# Patient Record
Sex: FEMALE | Race: OTHER | HISPANIC OR LATINO | URBAN - METROPOLITAN AREA
[De-identification: names, ages, dates, MRNs, and addresses within clinical notes are randomized per-mention and may not be internally consistent; named-entity substitution may affect disease eponyms.]

---

## 2023-03-06 ENCOUNTER — EMERGENCY (EMERGENCY)
Facility: HOSPITAL | Age: 38
LOS: 1 days | Discharge: HOME | End: 2023-03-06
Attending: EMERGENCY MEDICINE
Payer: MEDICAID

## 2023-03-06 VITALS
DIASTOLIC BLOOD PRESSURE: 59 MMHG | SYSTOLIC BLOOD PRESSURE: 117 MMHG | TEMPERATURE: 99 F | WEIGHT: 206.57 LBS | RESPIRATION RATE: 18 BRPM | OXYGEN SATURATION: 97 % | HEART RATE: 88 BPM

## 2023-03-06 LAB
FLUAV AG NPH QL: SIGNIFICANT CHANGE UP
FLUBV AG NPH QL: DETECTED
SARS-COV-2 RNA SPEC QL NAA+PROBE: SIGNIFICANT CHANGE UP

## 2023-03-06 PROCEDURE — 99283 EMERGENCY DEPT VISIT LOW MDM: CPT

## 2023-03-06 PROCEDURE — 87637 SARSCOV2&INF A&B&RSV AMP PRB: CPT

## 2023-03-06 RX ORDER — IBUPROFEN 200 MG
1 TABLET ORAL
Qty: 20 | Refills: 0
Start: 2023-03-06 | End: 2023-03-10

## 2023-03-06 RX ORDER — BENZOCAINE AND MENTHOL 5; 1 G/100ML; G/100ML
1 LIQUID ORAL
Qty: 24 | Refills: 0
Start: 2023-03-06 | End: 2023-03-07

## 2023-03-06 NOTE — ED PROVIDER NOTE - MUSCULOSKELETAL, MLM
No neck stiffness. Spine appears normal, range of motion is not limited, no muscle or joint tenderness

## 2023-03-06 NOTE — ED PROVIDER NOTE - NSFOLLOWUPINSTRUCTIONS_ED_ALL_ED_FT
[facility]                                                                            Infección de las vías respiratorias superiores en adultos    Upper Respiratory Infection, Adult      Ken infección de las vías respiratorias superiores (IVRS) afecta la nariz, la garganta y las vías respiratorias superiores que llegan a los pulmones. El tipo más común de IVRS suele conocerse denilson el resfrío común. Las IVRS generalmente mejoran solas, sin tratamiento médico.      ¿Cuáles son las causas?    La causa de las IVRS es un germen (virus). Puede contraer estos gérmenes de las siguientes maneras:  •Al aspirar las gotitas que ken persona infectada elimina al toser o estornudar.      •Al tocar algo que tiene el germen (está contaminado) y luego tocarse la boca, la nariz o los ojos.        ¿Qué incrementa el riesgo?    Es más propenso a contraer ken IVRS si:  •Es muy pequeño o de edad muy avanzada.      •Tiene contacto cercano con otros, denilson en el trabajo, la escuela o un centro de atención médica.      •Fuma.      •Tiene ken enfermedad cardíaca o pulmonar a micheal plazo (crónica).      •Tiene debilitado el sistema encargado de combatir las enfermedades (sistema inmunitario).      •Tiene asma o alergias nasales.      •Tiene mucho estrés.      •Tiene un déficit nutricional.        ¿Cuáles son los signos o síntomas?    •Secreción nasal o nariz tapada (congestión).      •Tos.      •Estornudos.      •Dolor de garganta.      •Dolor de mp.      •Sensación de cansancio (fatiga).      •Fiebre.      •No querer comer tanto denilson lo hace habitualmente.      •Dolor en la frente, detrás de los ojos y por encima de los pómulos (dolor sinusal).      •Sade musculares.      •Enrojecimiento o irritación de los ojos.      •Presión en los oídos o la emily.        ¿Cómo se trata?    Las IVRS generalmente mejoran por sí solas en un período de entre 7 y 10 días. Los medicamentos no curan las IVRS, emi el médico puede recomendarle ciertos medicamentos para ayudar a aliviar los síntomas, denilson por ejemplo:  •Medicamentos para la tos de venta nahid.      •Medicamentos para reducir la tos (antitusivos). La tos es un tipo de defensa contra las infecciones que ayuda a despejar la nariz, la garganta, la tráquea y los pulmones (el sistema respiratorio). [place] estos medicamentos solamente denilson se lo haya indicado el médico.      •Medicamentos para bajar la fiebre.        Siga estas instrucciones en sykes casa:    Actividad     •Descanse todo lo que sea necesario.    •Si tiene fiebre, permanezca en sykes casa, sin ir al trabajo o a la escuela, hasta que ya no tenga fiebre, o hasta que el médico le indique que puede regresar al trabajo o a la escuela.  •Debe permanecer en sykes casa hasta que ya no pueda propagar (contagiar) la infección.      •Es posible que el médico le indique que use ken mascarilla para tener menos riesgo de propagar la infección.        Para aliviar los síntomas     •Enjuáguese la boca frecuentemente con ken mezcla de agua con sal. Para preparar agua con sal, disuelva de ½ a 1 cucharadita (de 3 a 6 g) de sal en 1 taza (237 ml) de agua tibia.      •Use un humidificador de aire frío para agregar humedad al aire. Advance puede ayudarlo a que respire mejor.        Comida y bebida   Three cups showing dark yellow, yellow, and pale yellow pee.   •Saundra suficiente líquido para mantener la orina de color amarillo pálido.      •[place] sopas y caldos transparentes.        Instrucciones generales   A sign showing that a person should not smoke.   •Use los medicamentos de venta nahid y los recetados solamente denilson se lo haya indicado el médico.      • No fume ni consuma ningún producto que contenga nicotina o tabaco. Si necesita ayuda para dejar de fumar, consulte al médico.      •Evite estar cerca de personas que fuman (evite el humo ambiental de tabaco).      •Manténgase al día con todas las vacunas (inmunizaciones) y aplíquese la vacuna contra la gripe todos los años.      •Concurra a todas las visitas de seguimiento.        Cómo evitar contagiar la infección a otros   Washing hands with soap and water.   •Lávese las nica con agua y jabón abdulaziz al menos 20 segundos. Use un desinfectante para nica si no dispone de agua y jabón.      •Evite tocarse la boca, la emily, los ojos o la nariz.      •Tosa o estornude en un pañuelo de papel o sobre sykes manga o codo. No tosa o estornude al aire ni se cubra la boca o la nariz con la mano.        Comuníquese con un médico si:    •Siente que empeora o que no mejora.    •Tiene alguno de estos síntomas:  •Fiebre o escalofríos.      •Mucosidad color marrón o doris en la nariz.      •Líquido amarillento o amarronado (secreción)que le sale de la nariz.      •Dolor en la emily, especialmente al inclinarse hacia adelante.      •Ganglios del snow inflamados.      •Dolor al tragar.      •Zonas karrie en la parte de atrás de la garganta.          Solicite ayuda de inmediato si:    •La falta de aire empeora.    •Los siguientes síntomas son muy intensos o constantes:  •Dolor de mp.      •Dolor de oído.      •Dolor en la frente, detrás de los ojos y por encima de los pómulos (dolor sinusal).      •Dolor de pecho.      •Tiene ken enfermedad pulmonar prolongada (crónica) junto con cualquiera de estos síntomas:  •Emitir sonidos de silbidos agudos al respirar, más a menudo al exhalar (sibilancias).      •Tos prolongada (más de 14 días).      •Tos con agata.      •Cambio en la mucosidad habitual.        •Tiene rigidez en el snow.    •Tiene cambios en:  •La visión.      •La audición.      •El razonamiento.      •El estado de ánimo.        Estos síntomas pueden indicar ken emergencia. Solicite ayuda de inmediato. Llame al 911.   • No espere a fani si los síntomas desaparecen.       • No conduzca por nba propios medios hasta el hospital.         Resumen    •Ken infección de las vías respiratorias superiores (IVRS) es causada por un germen (virus). El tipo más común de IVRS suele conocerse denilson el resfrío común.      •Ken IVRS suele mejorar en el transcurso de 7 a 10 días.      •Use los medicamentos de venta nahid y los recetados solamente denilson se lo haya indicado el médico.      Esta información no tiene denilson fin reemplazar el consejo del médico. Asegúrese de hacerle al médico cualquier pregunta que tenga.      Document Revised: 08/15/2022 Document Reviewed: 08/15/2022    Elsevier Patient Education © 2023 Elsevier Inc.

## 2023-03-06 NOTE — ED ADULT NURSE NOTE - OBJECTIVE STATEMENT
36 y/o  w/  throat  pain    fever  yesterday  took  tylenol   and  fever  resolve. pt  able  to  speak  in  full  sentences no  signs  of  any  distress.

## 2023-03-06 NOTE — ED ADULT NURSE NOTE - NSIMPLEMENTINTERV_GEN_ALL_ED
Size Of Margin In Cm: 0.1 Implemented All Universal Safety Interventions:  [place] to call system. Call bell, personal items and telephone within reach. Instruct patient to call for assistance. Room bathroom lighting operational. Non-slip footwear when patient is off stretcher. Physically safe environment: no spills, clutter or unnecessary equipment. Stretcher in lowest position, wheels locked, appropriate side rails in place.

## 2023-03-06 NOTE — ED PROVIDER NOTE - PATIENT PORTAL LINK FT
You can access the FollowMyHealth Patient Portal offered by [facility] by registering at the following website: [email or web address]. By joining [employer]’s FollowMyHealth portal, you will also be able to view your health information using other applications (apps) compatible with our system.

## 2023-03-06 NOTE — ED PROVIDER NOTE - OBJECTIVE STATEMENT
37 year old female with no PMHx is presenting with 5 days of throat pain and cough. Patient reports that she was last immunized for COVID-19 1 year ago. No smoking or drinking. Patient had a fever yesterday and took a Tylenol. Today she doesn't have a fever.

## 2023-03-06 NOTE — ED PROVIDER NOTE - CLINICAL SUMMARY MEDICAL DECISION MAKING FREE TEXT BOX
Impression: URI. rule out COVID-19 and flu. Will get Covid/flu swab. Give supportive therapy. Impression: URI. rule out COVID-19 and flu. Will get Covid/flu swab. Give supportive therapy.   will provide supportive therapy

## 2023-03-06 NOTE — ED PROVIDER NOTE - ENMT, MLM
Throat with mild erythema but no exudate. Airway patent, Nasal mucosa clear. Mouth with normal mucosa. Throat has no vesicles and uvula is midline.

## 2023-03-07 ENCOUNTER — EMERGENCY (EMERGENCY)
Facility: HOSPITAL | Age: 38
LOS: 1 days | Discharge: HOME | End: 2023-03-07
Attending: STUDENT IN AN ORGANIZED HEALTH CARE EDUCATION/TRAINING PROGRAM
Payer: MEDICAID

## 2023-03-07 VITALS
TEMPERATURE: 98 F | HEART RATE: 87 BPM | RESPIRATION RATE: 18 BRPM | DIASTOLIC BLOOD PRESSURE: 80 MMHG | SYSTOLIC BLOOD PRESSURE: 129 MMHG | OXYGEN SATURATION: 98 %

## 2023-03-07 PROBLEM — Z78.9 OTHER SPECIFIED HEALTH STATUS: Chronic | Status: ACTIVE | Noted: 2023-03-06

## 2023-03-07 PROCEDURE — 99283 EMERGENCY DEPT VISIT LOW MDM: CPT

## 2023-03-07 PROCEDURE — 99284 EMERGENCY DEPT VISIT MOD MDM: CPT

## 2023-03-07 RX ORDER — DEXTROMETHORPHAN HYDROBROMIDE AND PROMETHAZINE HYDROCHLORIDE 15; 6.25 MG/5ML; MG/5ML
5 SYRUP ORAL
Qty: 100 | Refills: 0
Start: 2023-03-07

## 2023-03-07 NOTE — ED PROVIDER NOTE - OBJECTIVE STATEMENT
#[id number]    37-year-old female with no prior past medical history presents with flulike symptoms x5 days.  Patient reports initial fevers which have since resolved, nasal congestion, runny nose, sore throat, cough with productive brown sputum, and watery diarrhea over the past 5 days.  Patient seen yesterday and noted to be positive for influenza B.  Patient reports taking Tessalon with little improvement, states she is presenting today due to persistent cough.  Last dose of antipyretics last night.  Denies any chest pain, shortness of breath, abdominal pain, vomiting, drooling, voice change, or rash.  Denies any history of asthma or tobacco use.  Denies any additional complaints.

## 2023-03-07 NOTE — ED PROVIDER NOTE - PHYSICAL EXAMINATION
CONSTITUTIONAL: non-toxic, well appearing  SKIN: no rash, no petechiae.  EYES: pink conjunctiva, anicteric  ENT: tongue and uvular midline, no exudates, moist mucous membranes  NECK: Supple; no meningismus, no JVD  CARD: RRR, no murmurs, equal radial pulses bilaterally 2+  RESP: CTAB, no respiratory distress  ABD: Soft, non-tender, non-distended, no peritoneal signs  EXT: Normal ROM x4. No edema.   NEURO: Alert, oriented. Neuro exam nonfocal.  PSYCH: Cooperative, appropriate.

## 2023-03-07 NOTE — ED ADULT NURSE NOTE - CHIEF COMPLAINT QUOTE
Pt c/o worsened productive cough and throat pain  was seen in this ED yesterday, +FLu, medications that were prescribed to her is not effective

## 2023-03-07 NOTE — ED ADULT TRIAGE NOTE - CHIEF COMPLAINT QUOTE
Pt c/o worsened productive cough and throat pain  was seen in this ED yesterday, +FLu Pt c/o worsened productive cough and throat pain  was seen in this ED yesterday, +FLu, medications that were prescribed to her is not effective

## 2023-03-07 NOTE — ED PROVIDER NOTE - PATIENT PORTAL LINK FT
You can access the FollowMyHealth Patient Portal offered by [facility] by registering at the following website: [email or web address]. By joining [employer]’s FollowMyHealth portal, you will also be able to view your health information using other applications (apps) compatible with our system.

## 2023-03-07 NOTE — ED PROVIDER NOTE - NSFOLLOWUPINSTRUCTIONS_ED_ALL_ED_FT
Gripe en los adultos  Influenza, Adult    La gripe, también llamada “influenza”, es ken infección viral que afecta, principalmente, las vías respiratorias. Las vías respiratorias incluyen órganos que ayudan a respirar, denilson los pulmones, la nariz y la garganta. La gripe provoca muchos síntomas similares a los del resfrío común, junto con fiebre cris y dolor corporal.    Se transmite fácilmente de persona a persona (es contagiosa). La mejor manera de prevenir la gripe es aplicándose la vacuna contra la gripe todos los años.    ¿Cuáles son las causas?  La causa de esta afección es el virus de la influenza. Puede contraer el virus de las siguientes maneras:    Al inhalar las gotitas que están en el aire liberadas por la tos o el estornudo de ken persona infectada.  Al tocar algo que estuvo expuesto al virus (fue contaminado) y luego tocarse la boca, nariz u ojos.    ¿Qué incrementa el riesgo?  Los siguientes factores pueden hacer que usted sea propenso a contraer la gripe:    No lavarse o desinfectarse las nica con frecuencia.  Tener contacto cercano con muchas personas abdulaziz la temporada de resfrío y gripe.  Tocarse la boca, los ojos o la nariz sin antes lavarse ni desinfectarse las nica.  No colocarse la vacuna anual contra la gripe.    Puede correr un mayor riesgo de tener gripe, incluso problemas graves denilson ken infección pulmonar (neumonía), si:    Es mayor de 65 años de edad.  Está embarazada.  Tiene debilitado el sistema que combate las enfermedades (sistema inmunitario). Puede tener un sistema inmunitario debilitado si:    Tienen VIH o síndrome de inmunodeficiencia adquirida (SIDA).  Está recibiendo quimioterapia.  Usa medicamentos que reducen (suprimen) la actividad de sykes sistema inmunitario.  Tiene ken enfermedad a micheal plazo (crónica), denilson ken enfermedad cardíaca, enfermedad renal, diabetes o enfermedad pulmonar.  Tiene un trastorno hepático.  Tiene mucho sobrepeso (obesidad mórbida).  Tiene anemia. Esta es ken afección que afecta a los glóbulos rojos.  Tiene asma.    ¿Cuáles son los signos o los síntomas?  Los síntomas de esta afección por lo general comienzan de repente y lyman entre 4 y 14 días. Pueden incluir los siguientes:    Fiebre y escalofríos.  Siri de mp, siri en el cuerpo o siri musculares.  Dolor de garganta.  Tos.  Secreción o congestión nasal.  Malestar en el pecho.  Falta de apetito.  Debilidad o fatiga.  Mareos.  Náuseas o vómitos.    ¿Cómo se diagnostica?  Esta afección se puede diagnosticar en función de lo siguiente:    Los síntomas y los antecedentes médicos.  Un examen físico.   Un hisopado de nariz o garganta y el análisis del líquido extraído para detectar el virus de la gripe.    ¿Cómo se trata?  Si la gripe se diagnostica de forma temprana, puede tratarse con medicamentos que pueden ayudar a reducir la gravedad de la enfermedad y reducir sykes duración (medicamentos antivirales). Estos pueden administrarse por boca (vía oral) o por vía intravenosa.    Cuidarse en sykes hogar también puede ayudar a aliviar los síntomas. El médico puede recomendarle lo siguiente:    Hal medicamentos de venta nahid.  Beber mucho líquido.    En muchos casos, la gripe desaparece doe. Si tiene síntomas graves o complicaciones, puede tratarse en un hospital.    Siga estas indicaciones en sykes casa:      Actividad    Descanse según sea necesario y duerma aubrie.  Quédese en sykes casa y no concurra al trabajo o a la escuela denilson se lo haya indicado sykes médico. A menos que visite al médico, evite salir de sykes casa hasta que la fiebre haya desaparecido por 24 horas sin hal medicamentos.        Comida y bebida    [place] ken solución de rehidratación oral (oral rehydration solution, ORS). Esta es ken bebida que se vende en farmacias y tiendas minoristas.  Saundra suficiente líquido denilson para mantener la orina de color amarillo pálido.  En la medida en que pueda, saundra líquidos miguel en pequeñas cantidades. Saundra líquidos miguel, denilson agua, cubitos de hielo, jugos de fruta diluidos y bebidas deportivas bajas en calorías.  En la medida en que pueda, consuma alimentos blandos y fáciles de digerir en pequeñas cantidades. Estos alimentos incluyen bananas, compota de manzana, arroz, paulina magras, tostadas y galletas saladas.  Evite consumir líquidos que contengan mucha azúcar o cafeína, denilson bebidas energéticas, bebidas deportivas comunes y refrescos.  Evite hal alcohol.  Evite los alimentos condimentados o con alto contenido de grasa.        Indicaciones generales      [place] los medicamentos de venta nahid y los recetados solamente denilson se lo haya indicado el médico.  Use un humidificador de aire frío para agregar humedad al aire de sykes casa. [place] puede facilitar la respiración.  Al toser o estornudar, cúbrase la boca y la nariz.  Lávese las nica con agua y jabón frecuentemente, en especial después de toser o estornudar. Use desinfectante para nica con alcohol si no dispone de agua y jabón.  Concurra a todas las visitas de control denilson se lo haya indicado el médico. [place] es importante.    ¿Cómo se milli?     Colóquese la vacuna anual contra la gripe. Puede colocarse la vacuna contra la gripe a fines de verano, en otoño o en invierno. Pregúntele al médico cuándo debe colocarse la vacuna contra la gripe.  Evite el contacto con personas que están enfermas abdulaziz la temporada de resfrío y gripe. Generalmente es abdulaziz el otoño y el invierno.    Comuníquese con un médico si:  Tiene nuevos síntomas.  Tiene los siguientes síntomas:    Dolor en el pecho.  Diarrea.  Fiebre.  La tos empeora.  Produce más mucosidad.  Siente náuseas o vomita.    Solicite ayuda inmediatamente si:  Le falta el aire o tiene dificultad para respirar.  La piel o las uñas se tornan de un color azulado.  Presenta dolor intenso o rigidez de snow.  Le duele la mp, la emily o el oído de forma repentina.  No puede comer ni beber sin vomitar.    Resumen  La gripe es ken infección viral que afecta principalmente las vías respiratorias.  Los síntomas de la gripe normalmente comienzan de repente y lyman entre 4 y 14 días.  Colocarse la vacuna anual contra la gripe es la mejor manera de prevenir el contagio de la gripe.  Quédese en sykes casa y no concurra al trabajo o a la escuela denilson se lo haya indicado sykes médico. A menos que visite al médico, evite salir de sykes casa hasta que la fiebre haya desaparecido por 24 horas sin hal medicamentos.  Concurra a todas las visitas de control denilson se lo haya indicado el médico. [place] es importante.

## 2023-03-07 NOTE — ED PROVIDER NOTE - CLINICAL SUMMARY MEDICAL DECISION MAKING FREE TEXT BOX
Ro: 37-year-old female with no prior past medical history presents with flulike symptoms x5 days.  Patient reports initial fevers which have since resolved, nasal congestion, runny nose, sore throat, cough with productive brown sputum, and watery diarrhea over the past 5 days.  Patient seen yesterday and noted to be positive for influenza B.  Patient reports taking Tessalon with little improvement, states she is presenting today due to persistent cough.  Last dose of antipyretics last night.  Denies any chest pain, shortness of breath, abdominal pain, vomiting, drooling, voice change, or rash.  Denies any history of asthma or tobacco use.  Lungs CTAB, vital signs non-actionable. Discussed supportive care, PMD follow up, return precautions, pt understood and agreeable with plan.

## 2023-06-19 ENCOUNTER — EMERGENCY (EMERGENCY)
Facility: HOSPITAL | Age: 38
LOS: 1 days | Discharge: HOME | End: 2023-06-19
Attending: STUDENT IN AN ORGANIZED HEALTH CARE EDUCATION/TRAINING PROGRAM
Payer: MEDICAID

## 2023-06-19 VITALS
RESPIRATION RATE: 16 BRPM | OXYGEN SATURATION: 97 % | SYSTOLIC BLOOD PRESSURE: 121 MMHG | DIASTOLIC BLOOD PRESSURE: 75 MMHG | HEART RATE: 88 BPM | TEMPERATURE: 99 F

## 2023-06-19 VITALS
WEIGHT: 207.23 LBS | DIASTOLIC BLOOD PRESSURE: 74 MMHG | HEIGHT: 60.63 IN | SYSTOLIC BLOOD PRESSURE: 149 MMHG | TEMPERATURE: 98 F | OXYGEN SATURATION: 99 % | HEART RATE: 110 BPM | RESPIRATION RATE: 18 BRPM

## 2023-06-19 LAB
ACETONE SERPL-MCNC: NEGATIVE — SIGNIFICANT CHANGE UP
ALBUMIN SERPL ELPH-MCNC: 3.6 G/DL — SIGNIFICANT CHANGE UP (ref 3.5–5)
ALP SERPL-CCNC: 93 U/L — SIGNIFICANT CHANGE UP (ref 40–120)
ALT FLD-CCNC: 69 U/L DA — HIGH (ref 10–60)
ANION GAP SERPL CALC-SCNC: 10 MMOL/L — SIGNIFICANT CHANGE UP (ref 5–17)
APPEARANCE UR: CLEAR — SIGNIFICANT CHANGE UP
AST SERPL-CCNC: 66 U/L — HIGH (ref 10–40)
BASOPHILS # BLD AUTO: 0.05 K/UL — SIGNIFICANT CHANGE UP (ref 0–0.2)
BASOPHILS NFR BLD AUTO: 0.7 % — SIGNIFICANT CHANGE UP (ref 0–2)
BILIRUB SERPL-MCNC: 0.4 MG/DL — SIGNIFICANT CHANGE UP (ref 0.2–1.2)
BILIRUB UR-MCNC: NEGATIVE — SIGNIFICANT CHANGE UP
BUN SERPL-MCNC: 11 MG/DL — SIGNIFICANT CHANGE UP (ref 7–18)
CALCIUM SERPL-MCNC: 8.7 MG/DL — SIGNIFICANT CHANGE UP (ref 8.4–10.5)
CHLORIDE SERPL-SCNC: 103 MMOL/L — SIGNIFICANT CHANGE UP (ref 96–108)
CO2 SERPL-SCNC: 25 MMOL/L — SIGNIFICANT CHANGE UP (ref 22–31)
COLOR SPEC: YELLOW — SIGNIFICANT CHANGE UP
CREAT SERPL-MCNC: 0.71 MG/DL — SIGNIFICANT CHANGE UP (ref 0.5–1.3)
D DIMER BLD IA.RAPID-MCNC: 255 NG/ML DDU — HIGH
DIFF PNL FLD: NEGATIVE — SIGNIFICANT CHANGE UP
EGFR: 112 ML/MIN/1.73M2 — SIGNIFICANT CHANGE UP
EOSINOPHIL # BLD AUTO: 0.19 K/UL — SIGNIFICANT CHANGE UP (ref 0–0.5)
EOSINOPHIL NFR BLD AUTO: 2.5 % — SIGNIFICANT CHANGE UP (ref 0–6)
FLUAV AG NPH QL: SIGNIFICANT CHANGE UP
FLUBV AG NPH QL: SIGNIFICANT CHANGE UP
GLUCOSE SERPL-MCNC: 187 MG/DL — HIGH (ref 70–99)
GLUCOSE UR QL: NEGATIVE — SIGNIFICANT CHANGE UP
HCG SERPL-ACNC: <1 MIU/ML — SIGNIFICANT CHANGE UP
HCT VFR BLD CALC: 38.9 % — SIGNIFICANT CHANGE UP (ref 34.5–45)
HGB BLD-MCNC: 13.1 G/DL — SIGNIFICANT CHANGE UP (ref 11.5–15.5)
IMM GRANULOCYTES NFR BLD AUTO: 0.1 % — SIGNIFICANT CHANGE UP (ref 0–0.9)
KETONES UR-MCNC: NEGATIVE — SIGNIFICANT CHANGE UP
LEUKOCYTE ESTERASE UR-ACNC: NEGATIVE — SIGNIFICANT CHANGE UP
LYMPHOCYTES # BLD AUTO: 2.38 K/UL — SIGNIFICANT CHANGE UP (ref 1–3.3)
LYMPHOCYTES # BLD AUTO: 31.2 % — SIGNIFICANT CHANGE UP (ref 13–44)
MCHC RBC-ENTMCNC: 28.9 PG — SIGNIFICANT CHANGE UP (ref 27–34)
MCHC RBC-ENTMCNC: 33.7 GM/DL — SIGNIFICANT CHANGE UP (ref 32–36)
MCV RBC AUTO: 85.9 FL — SIGNIFICANT CHANGE UP (ref 80–100)
MONOCYTES # BLD AUTO: 0.68 K/UL — SIGNIFICANT CHANGE UP (ref 0–0.9)
MONOCYTES NFR BLD AUTO: 8.9 % — SIGNIFICANT CHANGE UP (ref 2–14)
NEUTROPHILS # BLD AUTO: 4.31 K/UL — SIGNIFICANT CHANGE UP (ref 1.8–7.4)
NEUTROPHILS NFR BLD AUTO: 56.6 % — SIGNIFICANT CHANGE UP (ref 43–77)
NITRITE UR-MCNC: NEGATIVE — SIGNIFICANT CHANGE UP
NRBC # BLD: 0 /100 WBCS — SIGNIFICANT CHANGE UP (ref 0–0)
NT-PROBNP SERPL-SCNC: 16 PG/ML — SIGNIFICANT CHANGE UP (ref 0–125)
PH UR: 7 — SIGNIFICANT CHANGE UP (ref 5–8)
PLATELET # BLD AUTO: 324 K/UL — SIGNIFICANT CHANGE UP (ref 150–400)
POTASSIUM SERPL-MCNC: 3.2 MMOL/L — LOW (ref 3.5–5.3)
POTASSIUM SERPL-SCNC: 3.2 MMOL/L — LOW (ref 3.5–5.3)
PROT SERPL-MCNC: 7.7 G/DL — SIGNIFICANT CHANGE UP (ref 6–8.3)
PROT UR-MCNC: NEGATIVE — SIGNIFICANT CHANGE UP
RBC # BLD: 4.53 M/UL — SIGNIFICANT CHANGE UP (ref 3.8–5.2)
RBC # FLD: 13 % — SIGNIFICANT CHANGE UP (ref 10.3–14.5)
SARS-COV-2 RNA SPEC QL NAA+PROBE: SIGNIFICANT CHANGE UP
SODIUM SERPL-SCNC: 138 MMOL/L — SIGNIFICANT CHANGE UP (ref 135–145)
SP GR SPEC: 1 — LOW (ref 1.01–1.02)
TROPONIN I, HIGH SENSITIVITY RESULT: <3 NG/L — SIGNIFICANT CHANGE UP
UROBILINOGEN FLD QL: NEGATIVE — SIGNIFICANT CHANGE UP
WBC # BLD: 7.62 K/UL — SIGNIFICANT CHANGE UP (ref 3.8–10.5)
WBC # FLD AUTO: 7.62 K/UL — SIGNIFICANT CHANGE UP (ref 3.8–10.5)

## 2023-06-19 PROCEDURE — 99285 EMERGENCY DEPT VISIT HI MDM: CPT | Mod: 25

## 2023-06-19 PROCEDURE — 82009 KETONE BODYS QUAL: CPT

## 2023-06-19 PROCEDURE — 93010 ELECTROCARDIOGRAM REPORT: CPT

## 2023-06-19 PROCEDURE — 81003 URINALYSIS AUTO W/O SCOPE: CPT

## 2023-06-19 PROCEDURE — 99285 EMERGENCY DEPT VISIT HI MDM: CPT

## 2023-06-19 PROCEDURE — 36415 COLL VENOUS BLD VENIPUNCTURE: CPT

## 2023-06-19 PROCEDURE — 84484 ASSAY OF TROPONIN QUANT: CPT

## 2023-06-19 PROCEDURE — 71045 X-RAY EXAM CHEST 1 VIEW: CPT

## 2023-06-19 PROCEDURE — 87637 SARSCOV2&INF A&B&RSV AMP PRB: CPT

## 2023-06-19 PROCEDURE — 83880 ASSAY OF NATRIURETIC PEPTIDE: CPT

## 2023-06-19 PROCEDURE — 71045 X-RAY EXAM CHEST 1 VIEW: CPT | Mod: 26

## 2023-06-19 PROCEDURE — 93005 ELECTROCARDIOGRAM TRACING: CPT

## 2023-06-19 PROCEDURE — 85025 COMPLETE CBC W/AUTO DIFF WBC: CPT

## 2023-06-19 PROCEDURE — 80053 COMPREHEN METABOLIC PANEL: CPT

## 2023-06-19 PROCEDURE — 82962 GLUCOSE BLOOD TEST: CPT

## 2023-06-19 PROCEDURE — 84702 CHORIONIC GONADOTROPIN TEST: CPT

## 2023-06-19 PROCEDURE — 85379 FIBRIN DEGRADATION QUANT: CPT

## 2023-06-19 RX ORDER — SODIUM CHLORIDE 9 MG/ML
1000 INJECTION INTRAMUSCULAR; INTRAVENOUS; SUBCUTANEOUS ONCE
Refills: 0 | Status: COMPLETED | OUTPATIENT
Start: 2023-06-19 | End: 2023-06-19

## 2023-06-19 RX ADMIN — SODIUM CHLORIDE 1000 MILLILITER(S): 9 INJECTION INTRAMUSCULAR; INTRAVENOUS; SUBCUTANEOUS at 08:10

## 2023-06-19 NOTE — ED PROVIDER NOTE - OBJECTIVE STATEMENT
37-year-old female no medical hx presenting with weakness, shortness of breath, sore throat for the past few days. No fevers or chills. No chest pain or SOB. Patient is tearful and stressed out as two of her family members  recently. Denies any other symptoms.

## 2023-06-19 NOTE — ED ADULT NURSE NOTE - OBJECTIVE STATEMENT
Pt a&ox3, in ED for SOB, diff breathing, however no SOB at this time, equal rise & fall, able to speak in full sentences, no N/V/D, c/o weakness, and thirst, pt assumed low blood sugar, PMH of DM.

## 2023-06-19 NOTE — ED PROVIDER NOTE - CLINICAL SUMMARY MEDICAL DECISION MAKING FREE TEXT BOX
37-year-old female no medical hx presenting with weakness, shortness of breath, sore throat for the past few days. Workup including labs, CXR, ECG all WNL.

## 2023-06-19 NOTE — ED PROVIDER NOTE - PATIENT PORTAL LINK FT
You can access the FollowMyHealth Patient Portal offered by [facility] by registering at the following website: [email or web address]. By joining [employer]’s FollowMyHealth portal, you will also be able to view your health information using other applications (apps) compatible with our system.

## 2023-06-19 NOTE — ED PROVIDER NOTE - NSFOLLOWUPINSTRUCTIONS_ED_ALL_ED_FT
You were seen in the emergency department for: weakness  Your results report is attached.   We recommend you follow up with: your primary care doctor    Please return to the Emergency Department if you experience any of the following symptoms:   - Shortness of breath or trouble breathing  - Pressure, pain or tightness in the chest  - Face drooping, arm weakness or speech difficulty  - Persistence of severe vomiting  - Head injury or loss of consciousness  - Nonstop bleeding or an open wound    (1) Follow up with your primary care physician within the next 24-48 hours as discussed. In addition, we did not find evidence of a life threatening illness on your testing here today, but listed below are the specialists that will be necessary to see as an outpatient to continue the workup.  Please call the numbers listed below or [phone] to set up the necessary appointments.  (2) Take Tylenol (up to 1000mg or 1 g)  and/or Motrin (up to 600mg) up to every 6 hours as needed for pain.   (3) If you had an IV (intravenous) line placed, it was removed. Sometimes, after IV removal, that area can be tender for a few days; if it develops redness and swelling, those could be signs of infection; in which case, return to the Emergency Department for assessment.  (4) Please continue taking all of your home medications as directed.

## 2023-06-19 NOTE — ED ADULT NURSE NOTE - HISTORY OF COVID-19 VACCINATION
CT showed reflux and thickening of the distal esophagus.  Cont omeprazole    Proceed with EGD for dysphagia/abnormal CT if pt desires.    If pt is agreeable, schedule at [facility]    Electronically signed by: Rani Chandler MD  3/7/2019   Yes

## 2023-06-19 NOTE — ED ADULT NURSE NOTE - NSFALLUNIVINTERV_ED_ALL_ED
Bed/Stretcher in lowest position, wheels locked, appropriate side rails in place/Call bell, personal items and telephone in reach/Instruct patient to call for assistance before getting out of bed/chair/stretcher/Non-slip footwear applied when patient is off stretcher/[place] to call system/Physically safe environment - no spills, clutter or unnecessary equipment/Purposeful proactive rounding/Room/bathroom lighting operational, light cord in reach

## 2023-06-24 ENCOUNTER — EMERGENCY (EMERGENCY)
Facility: HOSPITAL | Age: 38
LOS: 1 days | Discharge: HOME | End: 2023-06-24
Attending: EMERGENCY MEDICINE
Payer: MEDICAID

## 2023-06-24 VITALS
SYSTOLIC BLOOD PRESSURE: 111 MMHG | HEART RATE: 88 BPM | HEIGHT: 60.63 IN | DIASTOLIC BLOOD PRESSURE: 70 MMHG | WEIGHT: 202.83 LBS | RESPIRATION RATE: 18 BRPM | TEMPERATURE: 98 F | OXYGEN SATURATION: 97 %

## 2023-06-24 PROCEDURE — 93005 ELECTROCARDIOGRAM TRACING: CPT

## 2023-06-24 PROCEDURE — 99283 EMERGENCY DEPT VISIT LOW MDM: CPT

## 2023-06-24 PROCEDURE — 93010 ELECTROCARDIOGRAM REPORT: CPT

## 2023-06-24 PROCEDURE — 99284 EMERGENCY DEPT VISIT MOD MDM: CPT

## 2023-06-24 NOTE — ED PROVIDER NOTE - IV ALTEPLASE EXCL ABS HIDDEN
Detail Level: Detailed Depth Of Biopsy: dermis Was A Bandage Applied: Yes Size Of Lesion In Cm: 0.4 X Size Of Lesion In Cm: 0 Anticipated Plan (Based On Presumed Biopsy Results): Katja Biopsy Type: H and E Biopsy Method: Dermablade Anesthesia Type: 1% lidocaine with epinephrine Anesthesia Volume In Cc (Will Not Render If 0): 0.5 Hemostasis: Electrodesiccation Wound Care: Polysporin ointment Dressing: bandage Destruction After The Procedure: No Type Of Destruction Used: Curettage Curettage Text: The wound bed was treated with curettage after the biopsy was performed. Cryotherapy Text: The wound bed was treated with cryotherapy after the biopsy was performed. Electrodesiccation Text: The wound bed was treated with electrodesiccation after the biopsy was performed. Electrodesiccation And Curettage Text: The wound bed was treated with electrodesiccation and curettage after the biopsy was performed. Silver Nitrate Text: The wound bed was treated with silver nitrate after the biopsy was performed. Lab: [id number] Consent: Written consent was obtained and risks were reviewed including but not limited to scarring, infection, bleeding, scabbing, incomplete removal, nerve damage and allergy to anesthesia. Post-Care Instructions: I reviewed with the patient in detail post-care instructions. Patient is to keep the biopsy site dry overnight, and then apply bacitracin twice daily until healed. Patient may apply hydrogen peroxide soaks to remove any crusting. Notification Instructions: Patient will be notified of biopsy results. However, patient instructed to call the office if not contacted within 2 weeks. Billing Type: Third-Party Bill Information: Selecting Yes will display possible errors in your note based on the variables you have selected. This validation is only offered as a suggestion for you. PLEASE NOTE THAT THE VALIDATION TEXT WILL BE REMOVED WHEN YOU FINALIZE YOUR NOTE. IF YOU WANT TO FAX A PRELIMINARY NOTE YOU WILL NEED TO TOGGLE THIS TO 'NO' IF YOU DO NOT WANT IT IN YOUR FAXED NOTE. show

## 2023-06-24 NOTE — ED PROVIDER NOTE - CLINICAL SUMMARY MEDICAL DECISION MAKING FREE TEXT BOX
37 yr old female overweigh with no hx presents to ed c/o feeling like she is choking, unable to breathe and chest/throat pain while attempting to sleep tonight. this is her 3rd episode and last about 30 mins. was seen here about 4 days ago and was neg. no changes with previous episode. pt doesn't have a doctor but has medicaid. stressed out as two of her family members  recently.    panic attack 2/2 acute stress response- ekg normal. recently had normal work up. dc with outpatient follow up- list given

## 2023-06-24 NOTE — ED PROVIDER NOTE - OBJECTIVE STATEMENT
37 yr old female overweigh with no hx presents to ed c/o feeling like she is choking, unable to breathe and chest/throat pain while attempting to sleep tonight. this is her 3rd episode and last about 30 mins. was seen here about 4 days ago and was neg. no changes with previous episode. pt doesn't have a doctor but has medicaid. stressed out as two of her family members  recently.

## 2023-06-24 NOTE — ED PROVIDER NOTE - PATIENT PORTAL LINK FT
You can access the FollowMyHealth Patient Portal offered by [facility] by registering at the following website: [email or web address]. By joining [employer]’s FollowMyHealth portal, you will also be able to view your health information using other applications (apps) compatible with our system.

## 2023-06-24 NOTE — ED ADULT NURSE NOTE - HOW OFTEN DO YOU HAVE A DRINK CONTAINING ALCOHOL?
Internal Medicine
Nephrology
Internal Medicine
Internal Medicine
Gastroenterology
Internal Medicine
Never

## 2023-06-24 NOTE — ED PROVIDER NOTE - ENMT, MLM
Airway patent, Nasal mucosa clear. Mouth with normal mucosa. Throat has no vesicles, no oropharyngeal exudates and uvula is midline. no stridor, speaking clearly

## 2023-06-24 NOTE — ED PROVIDER NOTE - CONSTITUTIONAL, MLM
Well appearing, awake, alert, oriented to person, place, time/situation and in no apparent distress. overweigh normal...

## 2023-06-25 ENCOUNTER — EMERGENCY (EMERGENCY)
Facility: HOSPITAL | Age: 38
LOS: 1 days | Discharge: HOME | End: 2023-06-25
Attending: EMERGENCY MEDICINE
Payer: MEDICAID

## 2023-06-25 VITALS
TEMPERATURE: 99 F | SYSTOLIC BLOOD PRESSURE: 110 MMHG | WEIGHT: 202.83 LBS | HEART RATE: 72 BPM | OXYGEN SATURATION: 98 % | RESPIRATION RATE: 18 BRPM | HEIGHT: 60.63 IN | DIASTOLIC BLOOD PRESSURE: 76 MMHG

## 2023-06-25 VITALS
SYSTOLIC BLOOD PRESSURE: 111 MMHG | DIASTOLIC BLOOD PRESSURE: 68 MMHG | TEMPERATURE: 99 F | OXYGEN SATURATION: 98 % | HEART RATE: 60 BPM | RESPIRATION RATE: 18 BRPM

## 2023-06-25 LAB
ALBUMIN SERPL ELPH-MCNC: 3.4 G/DL — LOW (ref 3.5–5)
ALP SERPL-CCNC: 89 U/L — SIGNIFICANT CHANGE UP (ref 40–120)
ALT FLD-CCNC: 58 U/L DA — SIGNIFICANT CHANGE UP (ref 10–60)
ANION GAP SERPL CALC-SCNC: 9 MMOL/L — SIGNIFICANT CHANGE UP (ref 5–17)
AST SERPL-CCNC: 41 U/L — HIGH (ref 10–40)
BASOPHILS # BLD AUTO: 0.04 K/UL — SIGNIFICANT CHANGE UP (ref 0–0.2)
BASOPHILS NFR BLD AUTO: 0.5 % — SIGNIFICANT CHANGE UP (ref 0–2)
BILIRUB SERPL-MCNC: 0.4 MG/DL — SIGNIFICANT CHANGE UP (ref 0.2–1.2)
BUN SERPL-MCNC: 11 MG/DL — SIGNIFICANT CHANGE UP (ref 7–18)
CALCIUM SERPL-MCNC: 8.3 MG/DL — LOW (ref 8.4–10.5)
CHLORIDE SERPL-SCNC: 106 MMOL/L — SIGNIFICANT CHANGE UP (ref 96–108)
CO2 SERPL-SCNC: 24 MMOL/L — SIGNIFICANT CHANGE UP (ref 22–31)
CREAT SERPL-MCNC: 0.56 MG/DL — SIGNIFICANT CHANGE UP (ref 0.5–1.3)
EGFR: 120 ML/MIN/1.73M2 — SIGNIFICANT CHANGE UP
EOSINOPHIL # BLD AUTO: 0.16 K/UL — SIGNIFICANT CHANGE UP (ref 0–0.5)
EOSINOPHIL NFR BLD AUTO: 1.8 % — SIGNIFICANT CHANGE UP (ref 0–6)
GLUCOSE SERPL-MCNC: 134 MG/DL — HIGH (ref 70–99)
HCT VFR BLD CALC: 36.1 % — SIGNIFICANT CHANGE UP (ref 34.5–45)
HGB BLD-MCNC: 11.8 G/DL — SIGNIFICANT CHANGE UP (ref 11.5–15.5)
IMM GRANULOCYTES NFR BLD AUTO: 0.6 % — SIGNIFICANT CHANGE UP (ref 0–0.9)
LIDOCAIN IGE QN: 141 U/L — SIGNIFICANT CHANGE UP (ref 73–393)
LYMPHOCYTES # BLD AUTO: 2.29 K/UL — SIGNIFICANT CHANGE UP (ref 1–3.3)
LYMPHOCYTES # BLD AUTO: 26.2 % — SIGNIFICANT CHANGE UP (ref 13–44)
MCHC RBC-ENTMCNC: 28.3 PG — SIGNIFICANT CHANGE UP (ref 27–34)
MCHC RBC-ENTMCNC: 32.7 GM/DL — SIGNIFICANT CHANGE UP (ref 32–36)
MCV RBC AUTO: 86.6 FL — SIGNIFICANT CHANGE UP (ref 80–100)
MONOCYTES # BLD AUTO: 0.92 K/UL — HIGH (ref 0–0.9)
MONOCYTES NFR BLD AUTO: 10.5 % — SIGNIFICANT CHANGE UP (ref 2–14)
NEUTROPHILS # BLD AUTO: 5.27 K/UL — SIGNIFICANT CHANGE UP (ref 1.8–7.4)
NEUTROPHILS NFR BLD AUTO: 60.4 % — SIGNIFICANT CHANGE UP (ref 43–77)
NRBC # BLD: 0 /100 WBCS — SIGNIFICANT CHANGE UP (ref 0–0)
PLATELET # BLD AUTO: 351 K/UL — SIGNIFICANT CHANGE UP (ref 150–400)
POTASSIUM SERPL-MCNC: 3.4 MMOL/L — LOW (ref 3.5–5.3)
POTASSIUM SERPL-SCNC: 3.4 MMOL/L — LOW (ref 3.5–5.3)
PROT SERPL-MCNC: 7 G/DL — SIGNIFICANT CHANGE UP (ref 6–8.3)
RBC # BLD: 4.17 M/UL — SIGNIFICANT CHANGE UP (ref 3.8–5.2)
RBC # FLD: 13.5 % — SIGNIFICANT CHANGE UP (ref 10.3–14.5)
SODIUM SERPL-SCNC: 139 MMOL/L — SIGNIFICANT CHANGE UP (ref 135–145)
WBC # BLD: 8.73 K/UL — SIGNIFICANT CHANGE UP (ref 3.8–10.5)
WBC # FLD AUTO: 8.73 K/UL — SIGNIFICANT CHANGE UP (ref 3.8–10.5)

## 2023-06-25 PROCEDURE — 36415 COLL VENOUS BLD VENIPUNCTURE: CPT

## 2023-06-25 PROCEDURE — 99284 EMERGENCY DEPT VISIT MOD MDM: CPT | Mod: 25

## 2023-06-25 PROCEDURE — 85025 COMPLETE CBC W/AUTO DIFF WBC: CPT

## 2023-06-25 PROCEDURE — 83690 ASSAY OF LIPASE: CPT

## 2023-06-25 PROCEDURE — 99284 EMERGENCY DEPT VISIT MOD MDM: CPT

## 2023-06-25 PROCEDURE — 96374 THER/PROPH/DIAG INJ IV PUSH: CPT

## 2023-06-25 PROCEDURE — 80053 COMPREHEN METABOLIC PANEL: CPT

## 2023-06-25 RX ORDER — FAMOTIDINE 10 MG/ML
20 INJECTION INTRAVENOUS ONCE
Refills: 0 | Status: COMPLETED | OUTPATIENT
Start: 2023-06-25 | End: 2023-06-25

## 2023-06-25 RX ORDER — ACETAMINOPHEN 500 MG
650 TABLET ORAL ONCE
Refills: 0 | Status: COMPLETED | OUTPATIENT
Start: 2023-06-25 | End: 2023-06-25

## 2023-06-25 RX ADMIN — Medication 650 MILLIGRAM(S): at 07:18

## 2023-06-25 RX ADMIN — FAMOTIDINE 20 MILLIGRAM(S): 10 INJECTION INTRAVENOUS at 06:49

## 2023-06-25 RX ADMIN — Medication 30 MILLILITER(S): at 06:49

## 2023-06-25 RX ADMIN — Medication 650 MILLIGRAM(S): at 06:48

## 2023-06-25 NOTE — ED ADULT NURSE NOTE - NSFALLOOBATTEMPT_ED_ALL_ED
CERTIFICATE OF WORK    November 4, 2019      Regarding: Rosey aMndel  [address] [place] [address]      This is to certify that Rosey Mandel has been under my care from11/4/2019 and can return to light work on 11/11/2019.    RESTRICTIONS: Please allow Rosey to use the wheelchair and wear her hinged knee brace while she is at work. Also she will need to use the elevator. She may elevate as needed.      REMARKS: None        SIGNATURE:___________________________________________,   11/4/2019  Antonio Jaime MD MPH         Orthopaedics  [address]  [address] [phone]   No

## 2023-06-25 NOTE — ED PROVIDER NOTE - OBJECTIVE STATEMENT
37-year-old woman presenting complaining of left upper quadrant pain associated with nausea.  She reports the symptoms began yesterday during the day and are persisting.  She has been seen in the ER recently multiple times with various complaints related to stress due to a death in the family.  She tried aspirin at home along with an over-the-counter herbal medication for stress without improvement in her symptoms.  She denies any prior abdominal surgeries.  She denies any associated fevers.  She does report some associated loose bowel movements.

## 2023-06-25 NOTE — ED PROVIDER NOTE - CLINICAL SUMMARY MEDICAL DECISION MAKING FREE TEXT BOX
Patient presenting with left upper quadrant pain and some nausea.  Abdominal exam is overall relatively benign.  Symptoms still could be stress-induced.  Plan for screening labs treat for gastritis and reevaluate.

## 2023-06-25 NOTE — ED ADULT NURSE NOTE - NSFALLUNIVINTERV_ED_ALL_ED
Bed/Stretcher in lowest position, wheels locked, appropriate side rails in place/Call bell, personal items and telephone in reach/Instruct patient to call for assistance before getting out of bed/chair/stretcher/Non-slip footwear applied when patient is off stretcher/[place] to call system/Physically safe environment - no spills, clutter or unnecessary equipment/Purposeful proactive rounding/Room/bathroom lighting operational, light cord in reach

## 2023-06-25 NOTE — ED PROVIDER NOTE - NSFOLLOWUPINSTRUCTIONS_ED_ALL_ED_FT
Thank you for choosing [facility] for your health care.    You were seen in the emergency room for left-sided upper abdominal pain.  We treated you with Maalox and Tylenol in the emergency room and your symptoms improved.  Your blood work did not show any signs of of a life-threatening cause of your abdominal pain.  The symptoms you are having are possibly due to an upset stomach.  We would recommend you continue taking Tylenol, Maalox and Pepcid at home for your discomfort if it returns.  You can purchase all of these over-the-counter in any pharmacy.  Please follow-up with your primary care doctor in the next few days if your symptoms are persisting.  Please return to the emergency room for any further emergent or concerning medical issues.    Charles por elegir [facility] para sykes atención médica.    Lo vieron en la romana de emergencias por dolor abdominal superior en el lado new. Lo tratamos con Maalox y Tylenol en la romana de emergencias y nba síntomas mejoraron. Sykes análisis de agata no mostró signos de ken causa potencialmente mortal de sykes dolor abdominal. Los síntomas que está teniendo posiblemente se deban a un malestar estomacal. Le recomendamos que continúe tomando Tylenol, Maalox y Pepcid en sykes casa por sykes malestar si regresa. Puede comprar todos estos sin receta en cualquier farmacia. Harjinder un seguimiento con sykes médico de atención primaria en los próximos días si nba síntomas persisten. Regrese a la romana de emergencias por cualquier otro problema médico emergente o preocupante.

## 2023-06-25 NOTE — ED PROVIDER NOTE - PATIENT PORTAL LINK FT
You can access the FollowMyHealth Patient Portal offered by [facility] by registering at the following website: [email or web address]. By joining [employer]’s FollowMyHealth portal, you will also be able to view your health information using other applications (apps) compatible with our system.

## 2023-06-25 NOTE — ED ADULT NURSE NOTE - CCCP TRG CHIEF CMPLNT
Your Child’s Health  Four-Month-Old Visit    Tangela Davies, SHERLY     Oral Maldonado  July 30, 2022     Burp frequently after after each ounce  Keep her upright after feedings for 15-30 minutes    Visit Vitals  Pulse 128   Temp 98.6 °F (37 °C) (Tympanic)   Resp 30   Ht 24.21\" (61.5 cm)   Wt 5.915 kg   HC 39.5 cm (15.55\")   BMI 15.64 kg/m²       NUTRITION:  Do not give Oral a bottle in bed.  This increases the risk of ear infections.  Additionally, babies who fall asleep while drinking may wake more often in the night.  Because we have less sunlight in our part of the country, infants whose only source of nutrition is mother's milk should have nursing baby vitamins (available without prescription at the drug store) each day.  Formula fed babies should take vitamins also until they are drinking 32 ounces of formula each day.    DEVELOPMENT:  Babies this age will soon learn to roll from front to back or back to front, make baby sounds like \"goo\" and \"ah,\" reach for objects and later transfer objects from one hand to the other.  They explore by putting everything in their mouths.    ACCIDENT PREVENTION:  Scalding:  Adjust your hot-water heater temperature to 120-130 degrees F.  Falls:  Oral may crawl in the next few months.  Use trevino on stairways to prevent falls.  Small Toys and Sharp Objects:  Do not allow anyone to give Oral small toys that could cause choking or sharp objects that could cause cuts.  Smothering:  Keep plastic bags away from her.   Poisoning:  Use safety caps on medicines.  Household  and polishes must be kept out of reach.  Store medicines and  out of sight.  Don't transfer medicines to non-child-proofed or unlabeled containers.   · Dispose of unused medications.  Be epecially careful when visiting older persons or families without children in the house.  They may be in the habit of keeping their medicines out on tables.   · Syrup of Ipecac is no longer  recommended to be kept in the home.  Please dispose of any remaining supplies.  · Call the Poison Center at 1-443.191.1968 for any known or suspected poisoning.    CAR SAFETY:  An approved car seat in the back seat of the car is required by [place] law until Oral is four years old and weighs at least 40 pounds. Remember to use your child's car seat even for short trips.  Most accidents occur close to home.  Don't forget to use your own seat belt.  We have had several mothers in our practice who  in crashes while their infant in the car seat survived.    SUN EXPOSURE:  If you plan to have Oral outside for more than 30 minutes, please follow the guidelines in our Sun Exposure handout.    SMOKING:  Children exposed to tobacco smoke have more ear infections and pneumonia.  If you smoke, please quit.  If you cannot quit, smoke outside.  Do not smoke near Oral, and do not let others smoke near her.    MEDICATION FOR FEVER OR PAIN:   Acetaminophen liquid (e.g., Tylenol or Tempra) may be given every four hours as needed for pain or fever.  Acetaminophen liquid is less concentrated than the infant dropper bottle type. Be sure to check which product CONCENTRATION you are using.    OLD Concentration INFANT Tylenol/Acetaminophen  Drops (80 mg/0.8 mL)    Child’s Weight: Dose:  06 - 11 pounds:   40 mg (1/2 dropper (4/10 mL))  12 - 17 pounds:   80 mg (1 droppers)    NEW Concentration INFANT Tylenol/Acetaminophen  Drops (160 mg/5 mL)    Child’s Weight: Dose:  06 - 11 pounds:   40 mg (1.25 mL  (1/4 Teaspoon))  12 - 17 pounds:   80 mg (2.5 mL  (1/2 Teaspoon))    CHILDREN’S Tylenol/Acetaminophen  (160 mg/5 mL)    Child’s Weight: Dose:  06 - 11 pounds:   40 mg (1.25 mL  (1/4 Teaspoon))  12 - 17 pounds:   80 mg (2.5 mL  (1/2 Teaspoon))      There is no immunization history on file for this patient.    If Oral develops any of the following reactions within 72 hours after an immunization, notify your pediatrician by calling  the pediatric phone nurse:  · A temperature of 105 degrees or above.  · More than 3 hours of continuous crying.  · A shrill, high-pitched cry.  · A seizure or fainting spell.  In this case, you should call 911 or go immediately to the emergency room.    NEXT VISIT: SIX MONTHS OF AGE    Thank you for entrusting your care to [facility]     diarrhea/abdominal pain

## 2023-06-25 NOTE — ED PROVIDER NOTE - PHYSICAL EXAMINATION
Exam:  General: Patient well appearing, vital signs within normal limits  HEENT: airway patent with moist mucous membranes  Cardiac: RRR S1/S2 with strong peripheral pulses  Respiratory: lungs clear without respiratory distress  GI: abdomen soft, mildly tender LUQ, non distended  Neuro: no gross neurologic deficits  Skin: warm, well perfused  Psych: normal mood and affect

## 2023-07-08 ENCOUNTER — EMERGENCY (EMERGENCY)
Facility: HOSPITAL | Age: 38
LOS: 1 days | Discharge: HOME | End: 2023-07-08
Attending: STUDENT IN AN ORGANIZED HEALTH CARE EDUCATION/TRAINING PROGRAM
Payer: MEDICAID

## 2023-07-08 VITALS
DIASTOLIC BLOOD PRESSURE: 82 MMHG | WEIGHT: 200.62 LBS | RESPIRATION RATE: 17 BRPM | TEMPERATURE: 98 F | SYSTOLIC BLOOD PRESSURE: 147 MMHG | HEART RATE: 78 BPM | HEIGHT: 60.63 IN | OXYGEN SATURATION: 99 %

## 2023-07-08 LAB
ALBUMIN SERPL ELPH-MCNC: 3.8 G/DL — SIGNIFICANT CHANGE UP (ref 3.5–5)
ALP SERPL-CCNC: 104 U/L — SIGNIFICANT CHANGE UP (ref 40–120)
ALT FLD-CCNC: 74 U/L DA — HIGH (ref 10–60)
ANION GAP SERPL CALC-SCNC: 4 MMOL/L — LOW (ref 5–17)
AST SERPL-CCNC: 50 U/L — HIGH (ref 10–40)
BASOPHILS # BLD AUTO: 0.05 K/UL — SIGNIFICANT CHANGE UP (ref 0–0.2)
BASOPHILS NFR BLD AUTO: 0.7 % — SIGNIFICANT CHANGE UP (ref 0–2)
BILIRUB SERPL-MCNC: 0.3 MG/DL — SIGNIFICANT CHANGE UP (ref 0.2–1.2)
BUN SERPL-MCNC: 10 MG/DL — SIGNIFICANT CHANGE UP (ref 7–18)
CALCIUM SERPL-MCNC: 9.3 MG/DL — SIGNIFICANT CHANGE UP (ref 8.4–10.5)
CHLORIDE SERPL-SCNC: 108 MMOL/L — SIGNIFICANT CHANGE UP (ref 96–108)
CO2 SERPL-SCNC: 23 MMOL/L — SIGNIFICANT CHANGE UP (ref 22–31)
CREAT SERPL-MCNC: 0.65 MG/DL — SIGNIFICANT CHANGE UP (ref 0.5–1.3)
EGFR: 116 ML/MIN/1.73M2 — SIGNIFICANT CHANGE UP
EOSINOPHIL # BLD AUTO: 0.14 K/UL — SIGNIFICANT CHANGE UP (ref 0–0.5)
EOSINOPHIL NFR BLD AUTO: 1.9 % — SIGNIFICANT CHANGE UP (ref 0–6)
GLUCOSE SERPL-MCNC: 119 MG/DL — HIGH (ref 70–99)
HCG SERPL-ACNC: <1 MIU/ML — SIGNIFICANT CHANGE UP
HCT VFR BLD CALC: 42.3 % — SIGNIFICANT CHANGE UP (ref 34.5–45)
HGB BLD-MCNC: 13.9 G/DL — SIGNIFICANT CHANGE UP (ref 11.5–15.5)
IMM GRANULOCYTES NFR BLD AUTO: 0.3 % — SIGNIFICANT CHANGE UP (ref 0–0.9)
LIDOCAIN IGE QN: 173 U/L — SIGNIFICANT CHANGE UP (ref 73–393)
LYMPHOCYTES # BLD AUTO: 2.3 K/UL — SIGNIFICANT CHANGE UP (ref 1–3.3)
LYMPHOCYTES # BLD AUTO: 30.4 % — SIGNIFICANT CHANGE UP (ref 13–44)
MCHC RBC-ENTMCNC: 28.7 PG — SIGNIFICANT CHANGE UP (ref 27–34)
MCHC RBC-ENTMCNC: 32.9 GM/DL — SIGNIFICANT CHANGE UP (ref 32–36)
MCV RBC AUTO: 87.2 FL — SIGNIFICANT CHANGE UP (ref 80–100)
MONOCYTES # BLD AUTO: 0.5 K/UL — SIGNIFICANT CHANGE UP (ref 0–0.9)
MONOCYTES NFR BLD AUTO: 6.6 % — SIGNIFICANT CHANGE UP (ref 2–14)
NEUTROPHILS # BLD AUTO: 4.55 K/UL — SIGNIFICANT CHANGE UP (ref 1.8–7.4)
NEUTROPHILS NFR BLD AUTO: 60.1 % — SIGNIFICANT CHANGE UP (ref 43–77)
NRBC # BLD: 0 /100 WBCS — SIGNIFICANT CHANGE UP (ref 0–0)
PLATELET # BLD AUTO: 388 K/UL — SIGNIFICANT CHANGE UP (ref 150–400)
POTASSIUM SERPL-MCNC: 4.1 MMOL/L — SIGNIFICANT CHANGE UP (ref 3.5–5.3)
POTASSIUM SERPL-SCNC: 4.1 MMOL/L — SIGNIFICANT CHANGE UP (ref 3.5–5.3)
PROT SERPL-MCNC: 8.3 G/DL — SIGNIFICANT CHANGE UP (ref 6–8.3)
RBC # BLD: 4.85 M/UL — SIGNIFICANT CHANGE UP (ref 3.8–5.2)
RBC # FLD: 13 % — SIGNIFICANT CHANGE UP (ref 10.3–14.5)
SODIUM SERPL-SCNC: 135 MMOL/L — SIGNIFICANT CHANGE UP (ref 135–145)
WBC # BLD: 7.56 K/UL — SIGNIFICANT CHANGE UP (ref 3.8–10.5)
WBC # FLD AUTO: 7.56 K/UL — SIGNIFICANT CHANGE UP (ref 3.8–10.5)

## 2023-07-08 PROCEDURE — 86850 RBC ANTIBODY SCREEN: CPT

## 2023-07-08 PROCEDURE — 36415 COLL VENOUS BLD VENIPUNCTURE: CPT

## 2023-07-08 PROCEDURE — 96375 TX/PRO/DX INJ NEW DRUG ADDON: CPT

## 2023-07-08 PROCEDURE — 86901 BLOOD TYPING SEROLOGIC RH(D): CPT

## 2023-07-08 PROCEDURE — 99284 EMERGENCY DEPT VISIT MOD MDM: CPT

## 2023-07-08 PROCEDURE — 80053 COMPREHEN METABOLIC PANEL: CPT

## 2023-07-08 PROCEDURE — 76705 ECHO EXAM OF ABDOMEN: CPT | Mod: 26

## 2023-07-08 PROCEDURE — 86900 BLOOD TYPING SEROLOGIC ABO: CPT

## 2023-07-08 PROCEDURE — 85730 THROMBOPLASTIN TIME PARTIAL: CPT

## 2023-07-08 PROCEDURE — 96374 THER/PROPH/DIAG INJ IV PUSH: CPT

## 2023-07-08 PROCEDURE — 85610 PROTHROMBIN TIME: CPT

## 2023-07-08 PROCEDURE — 76705 ECHO EXAM OF ABDOMEN: CPT

## 2023-07-08 PROCEDURE — 85025 COMPLETE CBC W/AUTO DIFF WBC: CPT

## 2023-07-08 PROCEDURE — 99284 EMERGENCY DEPT VISIT MOD MDM: CPT | Mod: 25

## 2023-07-08 PROCEDURE — 83690 ASSAY OF LIPASE: CPT

## 2023-07-08 PROCEDURE — 84702 CHORIONIC GONADOTROPIN TEST: CPT

## 2023-07-08 RX ORDER — SUCRALFATE 1 G
1 TABLET ORAL ONCE
Refills: 0 | Status: COMPLETED | OUTPATIENT
Start: 2023-07-08 | End: 2023-07-08

## 2023-07-08 RX ORDER — KETOROLAC TROMETHAMINE 30 MG/ML
30 SYRINGE (ML) INJECTION ONCE
Refills: 0 | Status: DISCONTINUED | OUTPATIENT
Start: 2023-07-08 | End: 2023-07-08

## 2023-07-08 RX ORDER — SUCRALFATE 1 G
1 TABLET ORAL
Qty: 30 | Refills: 0
Start: 2023-07-08

## 2023-07-08 RX ORDER — FAMOTIDINE 10 MG/ML
1 INJECTION INTRAVENOUS
Qty: 20 | Refills: 0
Start: 2023-07-08

## 2023-07-08 RX ORDER — FAMOTIDINE 10 MG/ML
20 INJECTION INTRAVENOUS ONCE
Refills: 0 | Status: COMPLETED | OUTPATIENT
Start: 2023-07-08 | End: 2023-07-08

## 2023-07-08 RX ADMIN — Medication 30 MILLIGRAM(S): at 18:36

## 2023-07-08 RX ADMIN — FAMOTIDINE 20 MILLIGRAM(S): 10 INJECTION INTRAVENOUS at 18:35

## 2023-07-08 RX ADMIN — Medication 30 MILLIGRAM(S): at 19:06

## 2023-07-08 RX ADMIN — Medication 30 MILLILITER(S): at 18:35

## 2023-07-08 RX ADMIN — Medication 1 GRAM(S): at 18:35

## 2023-07-08 NOTE — ED PROVIDER NOTE - OBJECTIVE STATEMENT
37 y.o w/ abd pain x 2 weeks, epigastric, intermittent with food intake. associated with vomiting today. denies fever, dysuria, lower abd pain, cough, cp, sob.

## 2023-07-08 NOTE — ED PROVIDER NOTE - PATIENT PORTAL LINK FT
You can access the FollowMyHealth Patient Portal offered by [facility] by registering at the following website: [email or web address]. By joining [email or web address]’s FollowMyHealth portal, you will also be able to view your health information using other applications (apps) compatible with our system.

## 2023-07-08 NOTE — ED ADULT NURSE NOTE - OBJECTIVE STATEMENT
Pt presents to the ED with c/o epigastric pain x 2 weeks and vomiting today. Denies fever or diarrhea.

## 2023-07-08 NOTE — ED PROVIDER NOTE - CLINICAL SUMMARY MEDICAL DECISION MAKING FREE TEXT BOX
Patient presenting with abd pain, epigastric. associated with food intake, will obtain lab, sono. assess for gb stone. pain control and reassess

## 2023-07-08 NOTE — ED PROVIDER NOTE - PROGRESS NOTE DETAILS
Patient pain resolved. lab and us negative for pancreatitis or stone. clinically likely reflux/gastritis. patient given med, instructed to fu pmd and gi, return precaution instructed

## 2023-07-08 NOTE — ED ADULT NURSE NOTE - NSFALLUNIVINTERV_ED_ALL_ED
Call bell, personal items and telephone in reach/Instruct patient to call for assistance before getting out of bed/chair/stretcher/[place] to call system/Physically safe environment - no spills, clutter or unnecessary equipment/Purposeful proactive rounding/Room/bathroom lighting operational, light cord in reach

## 2023-07-08 NOTE — ED PROVIDER NOTE - NSFOLLOWUPINSTRUCTIONS_ED_ALL_ED_FT
Dolor abdominal en los adultos  Abdominal Pain, Adult  El dolor en el abdomen (dolor abdominal) puede tener muchas causas. A menudo, el dolor abdominal no es grave y mejora sin tratamiento o con tratamiento en la casa. Sin embargo, a veces el dolor abdominal es grave.    El médico le hará preguntas sobre nba antecedentes médicos y le hará un examen físico para tratar de determinar la causa del dolor abdominal.    Siga estas instrucciones en sykes casa:    Medicamentos    Use los medicamentos de venta nahid y los recetados solamente denilson se lo haya indicado el médico.  No tome un laxante a menos que se lo haya indicado el médico.  Instrucciones generales    Controle sykes afección para detectar cualquier cambio.  Saundra suficiente líquido denilson para mantener la orina de color amarillo pálido.  Concurra a todas las visitas de seguimiento denilson se lo haya indicado el médico. No Name es importante.  Comuníquese con un médico si:  El dolor abdominal cambia o empeora.  No tiene apetito o baja de peso sin proponérselo.  Está estreñido o tiene diarrea abdulaziz más de 2 o 3 arlene.  Tiene dolor cuando orina o defeca.  El dolor abdominal lo despierta de noche.  El dolor empeora con las comidas, después de comer o con determinados alimentos.  Tiene vómitos y no puede retener nada de lo que ingiere.  Tiene fiebre.  Observa agata en la orina.  Solicite ayuda inmediatamente si:  El dolor no desaparece tan pronto denilson el médico le dijo que era esperable.  No puede dejar de vomitar.  El dolor se siente solo en zonas del abdomen, denilson el lado derecho o la parte inferior izquierda del abdomen. Si se localiza en la akhil derecha, posiblemente podría tratarse de apendicitis.  Las heces son sanguinolentas o de color wallace, o de aspecto alquitranado.  Tiene dolor intenso, cólicos o distensión abdominal.  Tiene signos de deshidratación, denilson los siguientes:  Orina de color oscuro, muy escasa o falta de orina.  Labios agrietados.  Sequedad de boca.  Ojos hundidos.  Somnolencia.  Debilidad.  Tiene dificultad para respirar o dolor en el pecho.  Resumen  A menudo, el dolor abdominal no es grave y mejora sin tratamiento o con tratamiento en la casa. Sin embargo, a veces el dolor abdominal es grave.  Controle sykes afección para detectar cualquier cambio.  Use los medicamentos de venta nahid y los recetados solamente denilson se lo haya indicado el médico.  Comuníquese con un médico si el dolor abdominal cambia o empeora.  Busque ayuda de inmediato si tiene dolor intenso, cólicos o distensión abdominal.  Esta información no tiene denilson fin reemplazar el consejo del médico. Asegúrese de hacerle al médico cualquier pregunta que tenga.

## 2023-07-08 NOTE — ED PROVIDER NOTE - NSFOLLOWUPCLINICS_GEN_ALL_ED_FT
[facility]  Gastroenterology  [address] [place] [address]  Phone: [phone]  Fax: [phone]

## 2023-07-22 ENCOUNTER — EMERGENCY (EMERGENCY)
Facility: HOSPITAL | Age: 38
LOS: 1 days | Discharge: HOME | End: 2023-07-22
Attending: STUDENT IN AN ORGANIZED HEALTH CARE EDUCATION/TRAINING PROGRAM
Payer: MEDICAID

## 2023-07-22 VITALS
SYSTOLIC BLOOD PRESSURE: 123 MMHG | WEIGHT: 201.5 LBS | OXYGEN SATURATION: 99 % | TEMPERATURE: 98 F | DIASTOLIC BLOOD PRESSURE: 79 MMHG | RESPIRATION RATE: 17 BRPM | HEART RATE: 94 BPM | HEIGHT: 60.63 IN

## 2023-07-22 LAB
ALBUMIN SERPL ELPH-MCNC: 3.8 G/DL — SIGNIFICANT CHANGE UP (ref 3.5–5)
ALP SERPL-CCNC: 100 U/L — SIGNIFICANT CHANGE UP (ref 40–120)
ALT FLD-CCNC: 38 U/L DA — SIGNIFICANT CHANGE UP (ref 10–60)
ANION GAP SERPL CALC-SCNC: 8 MMOL/L — SIGNIFICANT CHANGE UP (ref 5–17)
AST SERPL-CCNC: 23 U/L — SIGNIFICANT CHANGE UP (ref 10–40)
BASOPHILS # BLD AUTO: 0.07 K/UL — SIGNIFICANT CHANGE UP (ref 0–0.2)
BASOPHILS NFR BLD AUTO: 0.8 % — SIGNIFICANT CHANGE UP (ref 0–2)
BILIRUB SERPL-MCNC: 0.3 MG/DL — SIGNIFICANT CHANGE UP (ref 0.2–1.2)
BUN SERPL-MCNC: 13 MG/DL — SIGNIFICANT CHANGE UP (ref 7–18)
CALCIUM SERPL-MCNC: 8.5 MG/DL — SIGNIFICANT CHANGE UP (ref 8.4–10.5)
CHLORIDE SERPL-SCNC: 105 MMOL/L — SIGNIFICANT CHANGE UP (ref 96–108)
CO2 SERPL-SCNC: 25 MMOL/L — SIGNIFICANT CHANGE UP (ref 22–31)
CREAT SERPL-MCNC: 1.11 MG/DL — SIGNIFICANT CHANGE UP (ref 0.5–1.3)
EGFR: 65 ML/MIN/1.73M2 — SIGNIFICANT CHANGE UP
EOSINOPHIL # BLD AUTO: 0.18 K/UL — SIGNIFICANT CHANGE UP (ref 0–0.5)
EOSINOPHIL NFR BLD AUTO: 2 % — SIGNIFICANT CHANGE UP (ref 0–6)
GLUCOSE SERPL-MCNC: 135 MG/DL — HIGH (ref 70–99)
HCG SERPL-ACNC: <1 MIU/ML — SIGNIFICANT CHANGE UP
HCT VFR BLD CALC: 40.7 % — SIGNIFICANT CHANGE UP (ref 34.5–45)
HGB BLD-MCNC: 13.4 G/DL — SIGNIFICANT CHANGE UP (ref 11.5–15.5)
IMM GRANULOCYTES NFR BLD AUTO: 0.4 % — SIGNIFICANT CHANGE UP (ref 0–0.9)
LIDOCAIN IGE QN: 176 U/L — SIGNIFICANT CHANGE UP (ref 73–393)
LYMPHOCYTES # BLD AUTO: 1.93 K/UL — SIGNIFICANT CHANGE UP (ref 1–3.3)
LYMPHOCYTES # BLD AUTO: 21.7 % — SIGNIFICANT CHANGE UP (ref 13–44)
MAGNESIUM SERPL-MCNC: 2 MG/DL — SIGNIFICANT CHANGE UP (ref 1.6–2.6)
MCHC RBC-ENTMCNC: 28.2 PG — SIGNIFICANT CHANGE UP (ref 27–34)
MCHC RBC-ENTMCNC: 32.9 GM/DL — SIGNIFICANT CHANGE UP (ref 32–36)
MCV RBC AUTO: 85.7 FL — SIGNIFICANT CHANGE UP (ref 80–100)
MONOCYTES # BLD AUTO: 0.66 K/UL — SIGNIFICANT CHANGE UP (ref 0–0.9)
MONOCYTES NFR BLD AUTO: 7.4 % — SIGNIFICANT CHANGE UP (ref 2–14)
NEUTROPHILS # BLD AUTO: 6.03 K/UL — SIGNIFICANT CHANGE UP (ref 1.8–7.4)
NEUTROPHILS NFR BLD AUTO: 67.7 % — SIGNIFICANT CHANGE UP (ref 43–77)
NRBC # BLD: 0 /100 WBCS — SIGNIFICANT CHANGE UP (ref 0–0)
PHOSPHATE SERPL-MCNC: 3.6 MG/DL — SIGNIFICANT CHANGE UP (ref 2.5–4.5)
PLATELET # BLD AUTO: 381 K/UL — SIGNIFICANT CHANGE UP (ref 150–400)
POTASSIUM SERPL-MCNC: 4.6 MMOL/L — SIGNIFICANT CHANGE UP (ref 3.5–5.3)
POTASSIUM SERPL-SCNC: 4.6 MMOL/L — SIGNIFICANT CHANGE UP (ref 3.5–5.3)
PROT SERPL-MCNC: 7.9 G/DL — SIGNIFICANT CHANGE UP (ref 6–8.3)
RBC # BLD: 4.75 M/UL — SIGNIFICANT CHANGE UP (ref 3.8–5.2)
RBC # FLD: 12.8 % — SIGNIFICANT CHANGE UP (ref 10.3–14.5)
SODIUM SERPL-SCNC: 138 MMOL/L — SIGNIFICANT CHANGE UP (ref 135–145)
TROPONIN I, HIGH SENSITIVITY RESULT: 3.3 NG/L — SIGNIFICANT CHANGE UP
WBC # BLD: 8.91 K/UL — SIGNIFICANT CHANGE UP (ref 3.8–10.5)
WBC # FLD AUTO: 8.91 K/UL — SIGNIFICANT CHANGE UP (ref 3.8–10.5)

## 2023-07-22 PROCEDURE — 93010 ELECTROCARDIOGRAM REPORT: CPT

## 2023-07-22 PROCEDURE — 93005 ELECTROCARDIOGRAM TRACING: CPT

## 2023-07-22 PROCEDURE — 83690 ASSAY OF LIPASE: CPT

## 2023-07-22 PROCEDURE — 84100 ASSAY OF PHOSPHORUS: CPT

## 2023-07-22 PROCEDURE — 99285 EMERGENCY DEPT VISIT HI MDM: CPT

## 2023-07-22 PROCEDURE — 84702 CHORIONIC GONADOTROPIN TEST: CPT

## 2023-07-22 PROCEDURE — 83735 ASSAY OF MAGNESIUM: CPT

## 2023-07-22 PROCEDURE — 85025 COMPLETE CBC W/AUTO DIFF WBC: CPT

## 2023-07-22 PROCEDURE — 99285 EMERGENCY DEPT VISIT HI MDM: CPT | Mod: 25

## 2023-07-22 PROCEDURE — 96374 THER/PROPH/DIAG INJ IV PUSH: CPT

## 2023-07-22 PROCEDURE — 36415 COLL VENOUS BLD VENIPUNCTURE: CPT

## 2023-07-22 PROCEDURE — 84484 ASSAY OF TROPONIN QUANT: CPT

## 2023-07-22 PROCEDURE — 80053 COMPREHEN METABOLIC PANEL: CPT

## 2023-07-22 RX ORDER — MECLIZINE HCL 12.5 MG
25 TABLET ORAL ONCE
Refills: 0 | Status: COMPLETED | OUTPATIENT
Start: 2023-07-22 | End: 2023-07-22

## 2023-07-22 RX ORDER — METOCLOPRAMIDE HCL 10 MG
10 TABLET ORAL ONCE
Refills: 0 | Status: COMPLETED | OUTPATIENT
Start: 2023-07-22 | End: 2023-07-22

## 2023-07-22 RX ORDER — SODIUM CHLORIDE 9 MG/ML
1000 INJECTION, SOLUTION INTRAVENOUS ONCE
Refills: 0 | Status: COMPLETED | OUTPATIENT
Start: 2023-07-22 | End: 2023-07-22

## 2023-07-22 RX ADMIN — SODIUM CHLORIDE 1000 MILLILITER(S): 9 INJECTION, SOLUTION INTRAVENOUS at 12:19

## 2023-07-22 RX ADMIN — Medication 25 MILLIGRAM(S): at 12:19

## 2023-07-22 RX ADMIN — Medication 10 MILLIGRAM(S): at 12:19

## 2023-07-22 NOTE — ED PROVIDER NOTE - CLINICAL SUMMARY MEDICAL DECISION MAKING FREE TEXT BOX
Ro: 37 year old female with PMH depression on Citalopram presents with vomiting this morning. Patient states she has been taking citalopram once daily for depression over the past 2 weeks, states she went to a "party," had a "few" alcoholic drinks last night, states she woke up this morning with dizziness, nausea, 1 episode of NBNB emesis, fatigue, and diffuse tingling. Patient states symptoms have improved, tingling resolved. Denies any fevers, chest pain, shortness of breath, abdominal pain, diarrhea, bloody stools, black tarry stools, dysuria, headache, vision change, numbness, focal weakness, or rash. Physical exam per above. No evidence of a cardiac arrythmia such as B[place], WPW, HOCM, long or short QT.  Neurologic exam is nonfocal, not c/w CVA or primary neurologic abnormality. Likely adverse reaction from citalopram/etoh, no signs of serotonin syndrome. Will obtain labs, provide supportive treatment, reassess with dispo pending workup.

## 2023-07-22 NOTE — ED PROVIDER NOTE - PROGRESS NOTE DETAILS
Iris-: pt seen and re-evaluated at bedside using  #[id number].  Pt states their symptoms have improved.  Pt comfortable in NAD.  Labs non-actionable, pt tolerating PO intake. Will DC with PMD follow up/return precautions, pt understood and agreeable with plan.

## 2023-07-22 NOTE — ED PROVIDER NOTE - PHYSICAL EXAMINATION
CONSTITUTIONAL: non-toxic, well appearing  SKIN: no rash, no petechiae.  EYES: PERRL, EOMI, pink conjunctiva, anicteric  ENT: tongue and uvular midline, no exudates, moist mucous membranes  NECK: Supple; no meningismus, no JVD  CARD: RRR, no murmurs, equal radial pulses bilaterally 2+  RESP: CTAB, no respiratory distress  ABD: Soft, non-tender, non-distended, no peritoneal signs, no CVA tenderness  EXT: Normal ROM x4. No edema.   NEURO: Alert, oriented. Neuro exam nonfocal, equal strength bilaterally. CN II-XII intact. Sensation grossly intact.   PSYCH: Cooperative, appropriate.

## 2023-07-22 NOTE — ED PROVIDER NOTE - OBJECTIVE STATEMENT
#[id number]    37 year old female with PMH depression on Citalopram presents with vomiting this morning. Patient states she has been taking citalopram once daily for depression over the past 2 weeks, states she went to a "party," had a "few" alcoholic drinks last night, states she woke up this morning with dizziness, nausea, 1 episode of NBNB emesis, fatigue, and diffuse tingling. Patient states symptoms have improved, tingling resolved. Denies any fevers, chest pain, shortness of breath, abdominal pain, diarrhea, bloody stools, black tarry stools, dysuria, headache, vision change, numbness, focal weakness, or rash. Denies any additional complaints.

## 2023-07-22 NOTE — ED PROVIDER NOTE - NSFOLLOWUPINSTRUCTIONS_ED_ALL_ED_FT
Dizziness    Dizziness can manifest as a feeling of unsteadiness or light-headedness. You may feel like you are about to faint. This condition can be caused by a number of things, including medicines, dehydration, or illness. Drink enough fluid to keep your urine clear or pale yellow. Do not drink alcohol and limit your caffeine intake. Avoid quick or sudden movements.  Rise slowly from chairs and steady yourself until you feel okay. In the morning, first sit up on the side of the bed.    SEEK IMMEDIATE MEDICAL CARE IF YOU HAVE ANY OF THE FOLLOWING SYMPTOMS: vomiting, changes in your vision or speech, weakness in your arms or legs, trouble speaking or swallowing, chest pain, abdominal pain, shortness of breath, sweating, bleeding, headache, neck pain, or fever.   _____________________  Mareo    El mareo puede manifestarse denilson ken sensación de inestabilidad o aturdimiento. Puede sentir que está a punto de desmayarse. Esta condición puede ser causada por ken serie de cosas, incluidos medicamentos, deshidratación o enfermedad. Saundra suficiente líquido para mantener la orina faye o de color amarillo pálido. No saundra alcohol y limite sykes consumo de cafeína. Evite los movimientos rápidos o bruscos. Levántese lentamente de las su y manténgase firme hasta que se sienta aubrie. Por la mañana, siéntese lorraine en el borde de la cama.    BUSQUE ATENCIÓN MÉDICA INMEDIATA SI TIENE ALGUNO DE LOS SIGUIENTES SÍNTOMAS: vómitos, cambios en la visión o el habla, debilidad en los brazos o las piernas, dificultad para hablar o tragar, dolor en el pecho, dolor abdominal, dificultad para respirar, sudoración, sangrado, dolor de mp, dolor de snow o fiebre.

## 2023-07-22 NOTE — ED PROVIDER NOTE - PATIENT PORTAL LINK FT
You can access the FollowMyHealth Patient Portal offered by [facility] by registering at the following website: [email or web address]. By joining [employer]’s FollowMyHealth portal, you will also be able to view your health information using other applications (apps) compatible with our system.

## 2023-07-22 NOTE — ED ADULT NURSE NOTE - OBJECTIVE STATEMENT
AOX4 +ambulatory patient reports been taking Citalopram for the 2 weeks for depression denies any SI or HI. Patient went out to part last night complaining of nausea dizziness and vomiting with diffuse tingling. Denies any drug use no weakness

## 2023-07-22 NOTE — ED ADULT NURSE NOTE - NSFALLUNIVINTERV_ED_ALL_ED
Bed/Stretcher in lowest position, wheels locked, appropriate side rails in place/Call bell, personal items and telephone in reach/Instruct patient to call for assistance before getting out of bed/chair/stretcher/Non-slip footwear applied when patient is off stretcher/[place] to call system/Physically safe environment - no spills, clutter or unnecessary equipment/Purposeful proactive rounding/Room/bathroom lighting operational, light cord in reach

## 2023-10-02 ENCOUNTER — EMERGENCY (EMERGENCY)
Facility: HOSPITAL | Age: 38
LOS: 1 days | Discharge: HOME | End: 2023-10-02
Attending: EMERGENCY MEDICINE
Payer: MEDICAID

## 2023-10-02 VITALS
HEART RATE: 82 BPM | WEIGHT: 195.99 LBS | OXYGEN SATURATION: 97 % | SYSTOLIC BLOOD PRESSURE: 126 MMHG | RESPIRATION RATE: 18 BRPM | DIASTOLIC BLOOD PRESSURE: 75 MMHG | TEMPERATURE: 99 F | HEIGHT: 62 IN

## 2023-10-02 PROCEDURE — 96374 THER/PROPH/DIAG INJ IV PUSH: CPT

## 2023-10-02 PROCEDURE — 99284 EMERGENCY DEPT VISIT MOD MDM: CPT | Mod: 25

## 2023-10-02 PROCEDURE — 99284 EMERGENCY DEPT VISIT MOD MDM: CPT

## 2023-10-02 PROCEDURE — 96375 TX/PRO/DX INJ NEW DRUG ADDON: CPT

## 2023-10-02 RX ORDER — CYCLOBENZAPRINE HYDROCHLORIDE 10 MG/1
10 TABLET, FILM COATED ORAL ONCE
Refills: 0 | Status: COMPLETED | OUTPATIENT
Start: 2023-10-02 | End: 2023-10-02

## 2023-10-02 RX ORDER — DEXAMETHASONE 0.5 MG/5ML
10 ELIXIR ORAL ONCE
Refills: 0 | Status: COMPLETED | OUTPATIENT
Start: 2023-10-02 | End: 2023-10-02

## 2023-10-02 RX ORDER — KETOROLAC TROMETHAMINE 30 MG/ML
30 SYRINGE (ML) INJECTION ONCE
Refills: 0 | Status: DISCONTINUED | OUTPATIENT
Start: 2023-10-02 | End: 2023-10-02

## 2023-10-02 RX ORDER — CYCLOBENZAPRINE HYDROCHLORIDE 10 MG/1
1 TABLET, FILM COATED ORAL
Qty: 10 | Refills: 0
Start: 2023-10-02 | End: 2023-10-11

## 2023-10-02 RX ORDER — IBUPROFEN 200 MG
1 TABLET ORAL
Qty: 30 | Refills: 0
Start: 2023-10-02 | End: 2023-10-11

## 2023-10-02 RX ORDER — LIDOCAINE 4 G/100G
1 CREAM TOPICAL
Qty: 2 | Refills: 0
Start: 2023-10-02 | End: 2023-10-11

## 2023-10-02 RX ORDER — SODIUM CHLORIDE 9 MG/ML
3 INJECTION INTRAMUSCULAR; INTRAVENOUS; SUBCUTANEOUS ONCE
Refills: 0 | Status: COMPLETED | OUTPATIENT
Start: 2023-10-02 | End: 2023-10-02

## 2023-10-02 RX ADMIN — Medication 10 MILLIGRAM(S): at 14:54

## 2023-10-02 RX ADMIN — Medication 30 MILLIGRAM(S): at 14:10

## 2023-10-02 RX ADMIN — CYCLOBENZAPRINE HYDROCHLORIDE 10 MILLIGRAM(S): 10 TABLET, FILM COATED ORAL at 14:09

## 2023-10-02 RX ADMIN — Medication 30 MILLIGRAM(S): at 14:40

## 2023-10-02 RX ADMIN — Medication 102 MILLIGRAM(S): at 14:29

## 2023-10-02 RX ADMIN — SODIUM CHLORIDE 3 MILLILITER(S): 9 INJECTION INTRAMUSCULAR; INTRAVENOUS; SUBCUTANEOUS at 14:17

## 2023-10-02 NOTE — ED ADULT NURSE NOTE - OBJECTIVE STATEMENT
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

## 2023-10-02 NOTE — ED PROVIDER NOTE - PATIENT PORTAL LINK FT
You can access the FollowMyHealth Patient Portal offered by [facility] by registering at the following website: [email or web address]. By joining [employer]’s FollowMyHealth portal, you will also be able to view your health information using other applications (apps) compatible with our system.

## 2023-10-02 NOTE — ED PROVIDER NOTE - CONSTITUTIONAL, MLM
Well appearing, awake, alert, oriented to person, place, time/situation and in minimal apparent distress. normal...

## 2023-10-02 NOTE — ED PROVIDER NOTE - OBJECTIVE STATEMENT
Patient is a 37-year-old complaining of back pain for several weeks works intermittently it is a  and has not worked in a week but continues having pain no fever no chills no burning frequency urgency on urination no nausea no vomitingPatient has had back problems before but has not seen a doctor

## 2023-10-02 NOTE — ED PROVIDER NOTE - NSFOLLOWUPINSTRUCTIONS_ED_ALL_ED_FT
Follow up with spine specialist          [place]    Cómo controlar el dolor de espalda crónico  Managing Chronic Back Pain  El dolor de espalda crónico es el dolor de espalda que dura 12 semanas o más. Con frecuencia afecta la akhil lumbar de la espalda. El dolor de espalda puede experimentarse denilson un dolor muscular o denilson un dolor tabatha y punzante. Puede ser leve, moderado o intenso.    Si le castillo diagnosticado dolor de espalda crónico, hay cosas que puede hacer para controlar los síntomas. Mark vez deba probar varias alternativas para determinar cuál es la más eficaz para usted. El médico también podrá darle instrucciones más específicas.    Cómo manejar los cambios en el estilo de naif  El tratamiento del dolor de espalda crónico suele comenzar con reposo y alivio del dolor, y continuar con ejercicios para restablecer el movimiento y fortalecer la espalda (fisioterapia). Es posible que deba someterse a ken cirugía si otros tratamientos no son eficaces, o si el dolor surge a raíz de ken afección o de ken lesión. Siga el plan de tratamiento denilson se lo haya indicado el médico. [place] puede incluir:  Técnicas de relajación.  Psicoterapia o asesoramiento psicológico con un especialista de yesica mental. La terapia cognitivo conductual (TCC), ken forma de psicoterapia, puede ser especialmente útil. Esta terapia le ayuda a establecer objetivos y a realizar un seguimiento de los cambios que hace.  Acupuntura o terapia de masajes.  Estimulación eléctrica local.  Inyecciones. Mediante las inyecciones, se administran anestésicos o analgésicos en la columna vertebral o en la akhil de dolor.  Cómo reconocer los cambios en el dolor de espalda crónico  La afección puede mejorar con el tratamiento. Sin embargo, el dolor de espalda puede no desaparecer o puede empeorar con el tiempo. Preste mucha atención a los síntomas e infórmele al médico si los síntomas empeoran o no mejoran.    El dolor de espalda puede empeorar si tiene lo siguiente:  Dolor que comienza a causar problemas con la postura.  Dolor que empeora cuando está sentado o parado, o cuando camina, se inclina o levanta peso.  Dolor que lo afecta mientras está activo, en reposo, o en ambas ocasiones.  Dolor que con el tiempo hace que le resulte difícil desplazarse (limita la movilidad).  Dolor que se manifiesta con fiebre, pérdida de peso o dificultad para orinar.  Dolor que ocasiona adormecimiento y hormigueo.  Cómo usar la mecánica del cuerpo y la postura para aliviar el dolor  La buena postura y la mecánica corporal saludable pueden ayudar a aliviar la tensión en la espalda. La mecánica corporal se refiere a los movimientos y a las posiciones del cuerpo abdulaziz las actividades diarias. La postura es ken parte de la mecánica corporal. Ken buena postura significa que:  La columna está en sykes posición natural de curvatura en S, o posición neutra.  Los hombros están ligeramente hacia atrás.  La mp no está inclinada hacia adelante.  Siga esas pautas para mejorar la postura y la mecánica corporal en nba actividades diarias.    De pie      Al estar de pie, mantenga la columna en la posición neutral y los pies separados al ancho de caderas, aproximadamente. Mantenga las rodillas levemente flexionadas. Las orejas, los hombros y las caderas deben estar alineados.  Cuando realice ken tarea para la que deba estar de pie en el mismo lugar abdulaziz mucho tiempo, apoye un pie sobre un objeto estable que tenga de 2 a 4 pulgadas (5 a 10 cm) de alto, denilson un taburete. [place] ayuda a que la columna mantenga ken posición neutral.  Sentado      Cuando esté sentado, mantenga la columna en posición neutral y los pies apoyados en el suelo. Use un apoyapiés, si es necesario, y mantenga los muslos paralelos al suelo. Evite redondear los hombros e inclinar la mp hacia adelante.  Cuando trabaje en un escritorio o con ken computadora, el escritorio debe estar a ken altura en la que las nica estén un poco más abajo que los codos. Deslice la silla debajo del escritorio, de modo de estar lo suficientemente cerca denilson para mantener ken buena postura.  Cuando trabaje con ken computadora, coloque el monitor a ken altura que le permita mirar derecho hacia adelante, sin tener que inclinar la mp hacia adelante o hacia atrás para fani la pantalla.  Levantamiento de objetos      Mantenga los pies separados, denilson mínimo, el ancho de los hombros y apriete los músculos del abdomen.  Flexione las rodillas y la cadera, y mantenga la columna en posición neutral. Asegúrese de levantar los objetos utilizando la fuerza de las piernas, no de la espalda. No trabe las rodillas hacia afuera.  Siempre pida ayuda a otra persona para levantar objetos pesados o incómodos.  Reposo      Al estar acostado o en reposo, evite las posiciones que le causen más dolor.  Si siente dolor al hacer actividades que exigen sentarse, inclinarse, agacharse o ponerse en cuclillas, acuéstese en ken posición en la que el cuerpo no deba doblarse mucho. Por ejemplo, evite acurrucarse de costado con los brazos y las rodillas cerca del pecho (posición fetal).  Si siente dolor con las actividades que exigen estar de pie abdulaziz mucho tiempo o estirar los brazos, acuéstese con la columna en ken posición neutral y flexione ligeramente las rodillas. Intente lo siguiente:  Acostarse de costado con ken almohada entre las rodillas.  Acostarse boca arriba con ken almohada debajo de las rodillas.  Siga estas instrucciones en sykes casa:  Medicamentos    El tratamiento puede incluir medicamentos para el dolor recetados o de venta nahid para el dolor y la inflamación que se xiao por boca o que se aplican sobre la piel. Otro tratamiento puede incluir relajantes musculares. Use los medicamentos de venta nahid y los recetados solamente denilson se lo haya indicado el médico.  Pregúntele al médico si el medicamento recetado:  Hace necesario que evite conducir o usar maquinaria.  Puede causarle estreñimiento. Es posible que tenga que hal estas medidas para prevenir o tratar el estreñimiento:  Beber suficiente líquido denilson para mantener la orina de color amarillo pálido.  Usar medicamentos recetados o de venta nahid.  Consumir alimentos ricos en fibra, denilson frijoles, cereales integrales, y frutas y verduras frescas.  Limitar el consumo de alimentos ricos en grasa y azúcares procesados, denilson los alimentos fritos o dulces.  Estilo de naif    No consuma ningún producto que contenga nicotina o tabaco, denilson cigarrillos, cigarrillos electrónicos y tabaco de mascar. Si necesita ayuda para dejar de consumir estos productos, consulte al médico.  Siga ken dieta saludable que incluya frutas, verduras, pescado y paulina magras.  Trabaje con sykes médico para alcanzar o mantener un peso saludable.  Instrucciones generales    Ximena ejercicio regularmente mark denilson se lo hayan indicado. El ejercicio mejora la flexibilidad y la fuerza.  Si le indicaron fisioterapia, ximena los ejercicios denilson se lo haya indicado el médico.  Use terapia con hielo o con calor denilson se lo haya indicado el médico.  Concurra a todas las visitas de seguimiento denilson se lo haya indicado el médico. [place] es importante.  ¿Dónde puedo obtener apoyo?  Considere la posibilidad de unirse a un j luis de apoyo para personas con dolor de espalda crónico. Pregunte a sykes médico sobre grupos de apoyo de sykes akhil. También puede encontrar grupos de apoyo en línea y presenciales en los siguientes sitios:  American Chronic Pain Association (Asociación Estadounidense del Dolor Crónico): theacpa.org  Pain Connection Program (Programa de conexión del dolor): painconnection.org  Comuníquese con un médico si:  Siente un dolor que no se mal con reposo o medicamentos.  Sykes dolor empeora o tiene un dolor nuevo.  Tiene fiebre.  Pierde de peso con rapidez.  Tiene dificultad para realizar las actividades cotidianas.  Solicite ayuda de inmediato si:  Siente debilidad o adormecimiento en ken o ambas piernas, o en srini o ambos pies.  Tiene dificultad para controlar la micción o la defecación.  Siente un dolor intenso en la espalda y tiene alguno de los siguientes síntomas:  Náuseas o vómitos.  Dolor abdominal.  Le falta el aire o se desmaya.  Resumen  Con frecuencia, el dolor de espalda crónico se trata con descanso, alivio del dolor y fisioterapia.  La psicoterapia, la acupuntura, los masajes y la estimulación eléctrica local pueden ayudar.  Siga el plan de tratamiento denilson se lo haya indicado el médico.  Unirse a un j luis de apoyo puede ayudarlo a manejar el dolor de espalda crónico.  Esta información no tiene denilson fin reemplazar el consejo del médico. Asegúrese de hacerle al médico cualquier pregunta que tenga.    Document Revised: 04/12/2021 Document Reviewed: 04/12/2021  Elsevier Patient Education © 2023 Elsevier Inc.

## 2023-10-02 NOTE — ED PROVIDER NOTE - NSTIMEPROVIDERCAREINITIATE_GEN_ER
How Severe Is Your Rosacea?: moderate
Is This A New Presentation, Or A Follow-Up?: Rosacea
02-Oct-2023 13:37

## 2023-10-02 NOTE — ED ADULT NURSE NOTE - NSFALLHARMRISKINTERV_ED_ALL_ED

## 2024-10-21 NOTE — ED ADULT NURSE NOTE - NS ED NURSE LEVEL OF CONSCIOUSNESS SPEECH
[facility] - Urology   Clinic Note    SUBJECTIVE:     Chief Complaint   Patient presents with    Testicle Pain       Referral from: No ref. provider found.    History of Present Illness:  Travis Marie is a 32 y.o. male who presents to clinic for superficial scrotal cyst.     Patient presents with complaints of a superficial scrotal cyst. It became painful and the patient popped it this past weekend. The pain has improved since he popped the cyst.  No prior  infection. Denies any issues with erections.  Denies any high-risk sexual activity.  Denies any dysuria or significantly bothersome urinary complaints.    Patient endorses no additional complaints at this time.    Past Medical History:   Diagnosis Date    ADHD (attention deficit hyperactivity disorder) 07/20/2012    High blood pressure     History of depression in remission 07/20/2012    Hypertension        Past Surgical History:   Procedure Laterality Date    VASECTOMY Bilateral 11/11/2022    Procedure: VASECTOMY;  Surgeon: Calvin Wood MD;  Location: [facility];  Service: Urology;  Laterality: Bilateral;       Family History   Problem Relation Name Age of Onset    ADD / ADHD Father      ADD / ADHD Brother      Depression Brother      Diabetes Mother         Social History     Tobacco Use    Smoking status: Never     Passive exposure: Never    Smokeless tobacco: Never   Substance Use Topics    Alcohol use: Yes     Comment: soicial    Drug use: No       Current Outpatient Medications on File Prior to Visit   Medication Sig Dispense Refill    buPROPion (WELLBUTRIN XL) 300 MG 24 hr tablet Take 1 tablet (300 mg total) by mouth once daily. 30 tablet 2    ciclopirox (PENLAC) 8 % Soln Apply topically nightly. 6.6 mL 6    clotrimazole (LOTRIMIN) 1 % cream Apply topically 2 (two) times daily. 45 g 2    clotrimazole-betamethasone 1-0.05% (LOTRISONE) cream Apply topically 2 (two) times daily. 45 g 1    dextroamphetamine-amphetamine (ADDERALL) 30 mg Tab Take 1 tablet (30 mg  "total) by mouth 2 (two) times a day. 60 tablet 0    dextroamphetamine-amphetamine (ADDERALL) 30 mg Tab Take 1 tablet (30 mg total) by mouth 2 (two) times a day. 60 tablet 0    dextroamphetamine-amphetamine 30 mg Tab Take 2 tabs daily 60 tablet 0    lamoTRIgine (LAMICTAL) 200 MG tablet Take 1 tablet (200 mg total) by mouth once daily. 90 tablet 1    semaglutide, weight loss, (WEGOVY) 0.5 mg/0.5 mL PnIj Inject 0.5 mg into the skin every 7 days. 2 mL 0    [START ON 11/18/2024] semaglutide, weight loss, (WEGOVY) 1 mg/0.5 mL PnIj Inject 1 mg into the skin every 7 days. 2 mL 0    valsartan (DIOVAN) 320 MG tablet TAKE 1 TABLET BY MOUTH ONCE DAILY. 90 tablet 3    triamcinolone acetonide 0.1% (KENALOG) 0.1 % cream Apply topically 2 (two) times daily. for 10 days 30 g 1     No current facility-administered medications on file prior to visit.       Review of patient's allergies indicates:  No Known Allergies    Review of Systems:  A review of 10+ systems was conducted with pertinent positive and negative findings documented in HPI with all other systems reviewed and negative.    OBJECTIVE:     Estimated body mass index is 36.81 kg/m² as calculated from the following:    Height as of this encounter: 5' 7" (1.702 m).    Weight as of this encounter: 106.6 kg (235 lb 0.2 oz).    Vital Signs (Most Recent)  Vitals:    10/21/24 1301   BP: (!) 149/84   Pulse: 94       Physical Exam:  GENERAL: patient sitting comfortably  HEENT: normocephalic  NECK: supple, no JVD  PULM: normal chest rise, no increased WOB  HEART: non-diaphoretic  ABDO: soft, nondistended, nontender  BACK: no CVA tenderness bilaterally  SKIN: warm, dry, well perfused  EXT: no bruising or edema  NEURO: grossly normal with no focal deficits  PSYCH: appropriate mood and affect    Genitourinary Exam:  phallus with orthotopic meatus without lesions.  Bilaterally descended testes in normal position and lie without appreciable masses.    Superficial anterior scrotal sebaceous " "cyst with minimal erythema and edema surrounding the wound edges.  Scrotum without erythema, lesions, or masses.  No appreciable inguinal hernias.    Lab Results   Component Value Date    BUN 15 05/25/2024    CREATININE 1.1 05/25/2024    WBC 6.50 05/25/2024    HGB 15.9 05/25/2024    HCT 48.5 05/25/2024     05/25/2024    AST 48 (H) 05/25/2024    ALT 50 (H) 05/25/2024    ALKPHOS 81 05/25/2024    ALBUMIN 4.0 05/25/2024    HGBA1C 5.4 01/27/2023        Imaging:  I have personally reviewed all relevant imaging studies.    No results found for this or any previous visit (from the past 2160 hours).  No results found for this or any previous visit (from the past 2160 hours).  X-Ray Foot Complete Right  Narrative: EXAMINATION:  XR FOOT COMPLETE 3 VIEW RIGHT    CLINICAL HISTORY:  . Pain in right foot    TECHNIQUE:  AP, lateral, and oblique views of the right foot were performed.    COMPARISON:  None    FINDINGS:  Bones are well mineralized.  Alignment is satisfactory and joint spaces appear adequately maintained.  No fracture, dislocation, or erosive change.  No significant degenerative changes.  No soft tissue abnormality appreciated.  Impression: No acute abnormality    Electronically signed by: Joss Whittington MD  Date:    09/18/2023  Time:    10:45       PSA:  No results found for: "PSA", "PSADIAG", "PSATOTAL", "PSAFREE"    Testosterone:  No results found for: "TOTALTESTOST", "TESTOSTERONE"     ASSESSMENT     1. Pain in testicle, unspecified laterality    2. Sebaceous cyst        PLAN:     Rx for Keflex for 5 days  Scrotal elevation, NSAIDS and ice PRN  Instructed to f/u with us if the pain, swelling and wound does not go away following finishing his abx    Calvin DIEGO Wood MD  Urology  [facility]    Disclaimer: This note has been generated using voice-recognition software. There may be typographical errors that have been missed during proof-reading.      " Speaking Coherently

## 2025-04-30 NOTE — ED PROVIDER NOTE - LANGUAGE ASSISTANCE NEEDED
Yes-Patient/Caregiver accepts free interpretation services... Oriented - self; Oriented - place; Oriented - time

## 2025-07-13 ENCOUNTER — EMERGENCY (EMERGENCY)
Facility: HOSPITAL | Age: 40
LOS: 1 days | End: 2025-07-13
Attending: STUDENT IN AN ORGANIZED HEALTH CARE EDUCATION/TRAINING PROGRAM
Payer: MEDICAID

## 2025-07-13 VITALS
HEIGHT: 62 IN | OXYGEN SATURATION: 100 % | TEMPERATURE: 98 F | RESPIRATION RATE: 18 BRPM | DIASTOLIC BLOOD PRESSURE: 78 MMHG | HEART RATE: 100 BPM | SYSTOLIC BLOOD PRESSURE: 143 MMHG | WEIGHT: 205.03 LBS

## 2025-07-13 PROCEDURE — 99284 EMERGENCY DEPT VISIT MOD MDM: CPT

## 2025-07-13 PROCEDURE — 99283 EMERGENCY DEPT VISIT LOW MDM: CPT

## 2025-07-13 RX ORDER — ALPRAZOLAM 0.5 MG
0.5 TABLET, EXTENDED RELEASE 24 HR ORAL ONCE
Refills: 0 | Status: DISCONTINUED | OUTPATIENT
Start: 2025-07-13 | End: 2025-07-13

## 2025-07-13 RX ORDER — HYDROXYZINE HYDROCHLORIDE 25 MG/1
2 TABLET, FILM COATED ORAL
Qty: 14 | Refills: 0
Start: 2025-07-13 | End: 2025-07-19

## 2025-07-13 RX ADMIN — Medication 0.5 MILLIGRAM(S): at 09:31

## 2025-07-13 NOTE — ED PROVIDER NOTE - PHYSICAL EXAMINATION
Gen: no acute distress  Head: normocephalic, atraumatic  Lung: CTAB, no respiratory distress, no wheezing, rales, rhonchi  CV: normal s1/s2, rrr,   Abd: soft, non-tender, non-distended  MSK: full range of motion in all 4 extremities  Neuro: No focal neurologic deficits  Psych: calm, cooperative. denies SI/HI/hallucinations/delusions

## 2025-07-13 NOTE — ED ADULT TRIAGE NOTE - AS TEMP SITE
Message  Pt called she is c/o vaginal itching irritation no d/c yet but is very uncomfortable has been treated in the past with Fluconazole wants to have that again Rx called in pt was told if no better to make OV      Active Problems    1  Anemia (285 9) (D64 9)   2  Dysmenorrhea (625 3) (N94 6)   3  Encounter for gynecological examination with Papanicolaou smear of cervix   (V72 31,V76 2) (Z01 419,Z12 4)   4  Encounter for IUD insertion (V25 11) (Z30 430)   5  Encounter for screening examination for sexually transmitted disease (V74 5) (Z11 3)   6  IUD contraception (V45 51) (Z97 5)   7  Menorrhagia (626 2) (N92 0)   8  Vaginal candidiasis (112 1) (B37 3)    Current Meds   1  Etodolac 400 MG Oral Tablet; Therapy: (Recorded:[id number]) to Recorded   2  Fluconazole 150 MG Oral Tablet (Diflucan); TAKE 1 TABLET Now AND ANOTHER IN 3   DAYS; Therapy: 36SXC4114 to (Evaluate:30Jun2016)  Requested for: 28Jun2016; Last   Rx:28Jun2016 Ordered   3  Fluconazole 150 MG Oral Tablet; tAKE ONE TABLET NOW AND THEN REPEAT IN 3   DAYS; Therapy: 28ZCT9410 to (Evaluate:88Ifq1644)  Requested for: 88Her5285; Last   Rx:[id number] Ordered   4  Hypercare 20 % SOLN;   Therapy: 54Eqv7553 to (Last Rx:12Apr2011)  Requested for: 83Nim1466 Ordered   5  Loestrin Fe 1 5/30 1 5-30 MG-MCG Oral Tablet; Take one tablet daily; Therapy: 18Jat9947 to (Evaluate:12Deb5479)  Requested for: 60Oyg8152; Last   Rx:[id number] Ordered   6  Misoprostol 200 MCG Oral Tablet; TAKE 2 TABLETS 4-10 HOURS BEFORE MIRENA   INSERTION; Therapy: 65RKB2493 to (Last Rx:06Jun2016)  Requested for: 07Jun2016 Ordered    Allergies    1  Dilaudid   2   Bactrim TABS    Plan  Vaginal candidiasis    · Fluconazole 150 MG Oral Tablet; tAKE ONE TABLET NOW AND THEN REPEAT IN  3 DAYS    Signatures   Electronically signed by : Seven Perez, ; Jul 29 2016 12:46PM EST                       (Author)
oral

## 2025-07-13 NOTE — ED PROVIDER NOTE - PATIENT PORTAL LINK FT
You can access the FollowMyHealth Patient Portal offered by [facility] by registering at the following website: [email or web address]. By joining [facility]’s FollowMyHealth portal, you will also be able to view your health information using other applications (apps) compatible with our system.

## 2025-07-13 NOTE — ED PROVIDER NOTE - NSFOLLOWUPINSTRUCTIONS_ED_ALL_ED_FT
Ansiedad    LO QUE NECESITA SABER:    La ansiedad es ken afección que provoca que usted se sienta extremadamente preocupado o nervioso. Los sentimientos son henderson patricia que pueden causar problemas en niyah actividades diarias o el sueño. La ansiedad puede desencadenarse por algo que teme o puede ocurrir sin ken causa. El estrés familiar o laboral, fumar, la cafeína y el alcohol pueden aumentar sykes riesgo para sufrir ansiedad. Ciertos medicamentos o condiciones de yesica también pueden aumentar sykes riesgo. La ansiedad puede convertirse en ken afección de larga duración si no se controla ni se trata.    INSTRUCCIONES SOBRE EL GERALDO HOSPITALARIA:    Llame al número de emergencias local (911 en los [place]) si:    Usted tiene dolor de pecho, presión o pesadez que pueden llegar a propagarse a niyah hombros, brazos, mandíbula, snow o espalda    Usted piensa en lastimarse o lastimar a alguien más.  Llame a sykes médico si:    Niyah síntomas empeoran o no mejoran con el tratamiento.    Sykes ansiedad milli que realice niyah actividades diarias.    Tiene nuevos síntomas desde sykes última consulta.    Usted tiene preguntas o inquietudes acerca de sykes condición o cuidado.  Los siguientes recursos están disponibles en cualquier momento para ayudarlo, si es necesario:    Comuníquese con ken organización de prevención del suicidio:  Para la 988 Suicide and Crisis Lifeline (línea de naif 988 contra el suicidio y la crisis):  Llame o envíe un mensaje de texto al 988    Envíe un mensaje de chat en [email or web address]    Llame al [phone] (1-709-906-TALK)    Para la Suicide Hotline (línea de atención al suicida), llame al 2-502-882-1170 (0-340-DVPLHXA)    Para obtener ken lista de números internacionales: https://save.org/find-help/international-resources/  Medicamentos:Es posible que usted necesite alguno de los siguientes:    Medicamentos para la ansiedad o antidepresivospueden ayudar a aliviar o prevenir la ansiedad. Es posible que usted deba hal los medicamentos por varias semanas antes de empezar a sentirse mejor. Informe a sykes médico sobre cualquier efecto secundario o problemas que usted presente con sykes medicamento. Podría ser necesario cambiar el tipo o cantidad de medicamento.    Deary niyah medicamentos joshua se le haya indicado.Consulte con sykes médico si usted delilah que sykes medicamento no le está ayudando o si presenta efectos secundarios. Infórmele al médico si usted es alérgico a algún medicamento. Mantenga ken lista actualizada de los medicamentos, las vitaminas y los productos herbales que renata. Incluya los siguientes datos de los medicamentos: cantidad, frecuencia y motivo de administración. Traiga con usted la lista o los envases de las píldoras a niyah citas de seguimiento. Lleve la lista de los medicamentos con usted en edgar de ken emergencia.  La terapia cognitivo conductual (TCC)le enseña a identificar y cambiar patrones de pensamiento negativos.    Maneje la ansiedad:    Hable con alguien sobre sykes ansiedad.Sykes médico puede sugerirle que reciba consejería. Usted podría sentirse más cómodo hablando con un familiar o amigo sobre sykes ansiedad. Elija a alguien que usted sepa que será comprensivo y alentador.    Realice actividad física regularmente.La actividad física puede reducir sykes estrés, mejorar sykes estado de ánimo y ayudarle a dormir mejor. Colabore con sykes médico para crear un plan de ejercicios que usted disfrute.  TALIB ASIÁTICA CAMINANDO JOSHUA EJERCICIO      Establezca un horario regular para dormir.Ken rutina puede ayudarlo a relajarse antes de irse a dormir. Escuche música, conor o ximena yoga. Trate de irse a dormir y despertarse al mismo tiempo todos los días. El sueño es importante para la yesica emocional.    Realice actividades que disfruta.Pase tiempo con amigos o ximena cosas divertidas. Elija actividades con las que esté familiarizado o que le resulten cómodas. [place] puede ayudar a prevenir la ansiedad.    Practique la respiración profunda.La respiración profunda puede ayudarlo a relajarse cuando se sienta ansioso. Enfóquese en respirar lento y profundo varias veces al día, o abdulaziz un ataque de ansiedad. Respire por la nariz y exhale por la boca. La respiración profunda combinada con la meditación o escuchar música puede ayudarlo a sentirse más tranquilo.    No fume.La nicotina y otros químicos en los cigarrillos y cigarros pueden aumentar la ansiedad. Pida información a sykes médico si usted actualmente fuma y necesita ayuda para dejar de fumar. Los cigarrillos electrónicos o el tabaco sin humo igualmente contienen nicotina. Consulte con sykes médico antes de utilizar estos productos.    No consuma cafeína.La cafeína puede empeorar niyah síntomas. No consuma alimentos o bebidas que tengan el propósito de aumentar sykes nivel de energía.    No tome alcohol ni use drogas.El alcohol y las drogas pueden empeorar la ansiedad o dificultar sykes control. Hable con sykes terapeuta o médico si necesita ayuda para dejar.  Consejos para el bienestar  Acuda a niyah consultas de control con sykes terapeuta o médico según le indicaron:Sykes médico vigilará sykes progreso en las citas de seguimiento. Sykes médico también controlará sykes medicación si renata antidepresivos y le preguntará si el medicamento le está ayudando. Informe a sykes médico sobre cualquier efecto secundario o problemas que usted presente con los medicamentos. Podría ser necesario cambiar el tipo o cantidad de medicamento. Anote niyah preguntas para que se acuerde de hacerlas abdulaziz niyah visitas.    Para apoyo o más información:    National [place] [employer] Mental Illness  [address]  [place], [place]  Phone: [phone]  Phone: [phone]  Web Address: http://www.emmie.Siteheart  989 Suicide and Crisis Lifeline  [address]  [place], [place]  Phone: [phone]  Web Address: http://www.suicidepreventionlifeline.org OR [email or web address]/chat/

## 2025-07-13 NOTE — ED ADULT TRIAGE NOTE - BIRTH SEX
Birth Control Pills Counseling: Birth Control Pill Counseling: I discussed with the patient the potential side effects of OCPs including but not limited to increased risk of stroke, heart attack, thrombophlebitis, deep venous thrombosis, hepatic adenomas, breast changes, GI upset, headaches, and depression. The patient verbalized understanding of the proper use and possible adverse effects of OCPs. All of the patient's questions and concerns were addressed. Tetracycline Pregnancy And Lactation Text: This medication is Pregnancy Category D and not consider safe during pregnancy. It is also excreted in breast milk. Bactrim Counseling:  I discussed with the patient the risks of sulfa antibiotics including but not limited to GI upset, allergic reaction, drug rash, diarrhea, dizziness, photosensitivity, and yeast infections. Rarely, more serious reactions can occur including but not limited to aplastic anemia, agranulocytosis, methemoglobinemia, blood dyscrasias, liver or kidney failure, lung infiltrates or desquamative/blistering drug rashes. Benzoyl Peroxide Pregnancy And Lactation Text: This medication is Pregnancy Category C. It is unknown if benzoyl peroxide is excreted in breast milk. Azithromycin Counseling:  I discussed with the patient the risks of azithromycin including but not limited to GI upset, allergic reaction, drug rash, diarrhea, and yeast infections. Spironolactone Pregnancy And Lactation Text: This medication can cause feminization of the male fetus and should be avoided during pregnancy. The active metabolite is also found in breast milk. Tazorac Pregnancy And Lactation Text: This medication is not safe during pregnancy. It is unknown if this medication is excreted in breast milk. Doxycycline Counseling:  Patient counseled regarding possible photosensitivity and increased risk for sunburn. Patient instructed to avoid sunlight, if possible. When exposed to sunlight, patients should wear protective clothing, sunglasses, and sunscreen. The patient was instructed to call the office immediately if the following severe adverse effects occur:  hearing changes, easy bruising/bleeding, severe headache, or vision changes. The patient verbalized understanding of the proper use and possible adverse effects of doxycycline. All of the patient's questions and concerns were addressed. Topical Clindamycin Pregnancy And Lactation Text: This medication is Pregnancy Category B and is considered safe during pregnancy. It is unknown if it is excreted in breast milk. Topical Retinoid Pregnancy And Lactation Text: This medication is Pregnancy Category C. It is unknown if this medication is excreted in breast milk. Dapsone Counseling: I discussed with the patient the risks of dapsone including but not limited to hemolytic anemia, agranulocytosis, rashes, methemoglobinemia, kidney failure, peripheral neuropathy, headaches, GI upset, and liver toxicity. Patients who start dapsone require monitoring including baseline LFTs and weekly CBCs for the first month, then every month thereafter. The patient verbalized understanding of the proper use and possible adverse effects of dapsone. All of the patient's questions and concerns were addressed. Isotretinoin Pregnancy And Lactation Text: This medication is Pregnancy Category X and is considered extremely dangerous during pregnancy. It is unknown if it is excreted in breast milk. Topical Sulfur Applications Counseling: Topical Sulfur Counseling: Patient counseled that this medication may cause skin irritation or allergic reactions. In the event of skin irritation, the patient was advised to reduce the amount of the drug applied or use it less frequently. The patient verbalized understanding of the proper use and possible adverse effects of topical sulfur application. All of the patient's questions and concerns were addressed. Erythromycin Pregnancy And Lactation Text: This medication is Pregnancy Category B and is considered safe during pregnancy. It is also excreted in breast milk. High Dose Vitamin A Pregnancy And Lactation Text: High dose vitamin A therapy is contraindicated during pregnancy and breast feeding. Benzoyl Peroxide Counseling: Patient counseled that medicine may cause skin irritation and bleach clothing. In the event of skin irritation, the patient was advised to reduce the amount of the drug applied or use it less frequently. The patient verbalized understanding of the proper use and possible adverse effects of benzoyl peroxide. All of the patient's questions and concerns were addressed. Bactrim Pregnancy And Lactation Text: This medication is Pregnancy Category D and is known to cause fetal risk. It is also excreted in breast milk. Use Enhanced Medication Counseling?: No Azithromycin Pregnancy And Lactation Text: This medication is considered safe during pregnancy and is also secreted in breast milk. Tetracycline Counseling: Patient counseled regarding possible photosensitivity and increased risk for sunburn. Patient instructed to avoid sunlight, if possible. When exposed to sunlight, patients should wear protective clothing, sunglasses, and sunscreen. The patient was instructed to call the office immediately if the following severe adverse effects occur:  hearing changes, easy bruising/bleeding, severe headache, or vision changes. The patient verbalized understanding of the proper use and possible adverse effects of tetracycline. All of the patient's questions and concerns were addressed. Patient understands to avoid pregnancy while on therapy due to potential birth defects. Topical Clindamycin Counseling: Patient counseled that this medication may cause skin irritation or allergic reactions. In the event of skin irritation, the patient was advised to reduce the amount of the drug applied or use it less frequently. The patient verbalized understanding of the proper use and possible adverse effects of clindamycin. All of the patient's questions and concerns were addressed. Doxycycline Pregnancy And Lactation Text: This medication is Pregnancy Category D and not consider safe during pregnancy. It is also excreted in breast milk but is considered safe for shorter treatment courses. Dapsone Pregnancy And Lactation Text: This medication is Pregnancy Category C and is not considered safe during pregnancy or breast feeding. Topical Retinoid counseling:  Patient advised to apply a pea-sized amount only at bedtime and wait 30 minutes after washing their face before applying. If too drying, patient may add a non-comedogenic moisturizer. The patient verbalized understanding of the proper use and possible adverse effects of retinoids. All of the patient's questions and concerns were addressed. Birth Control Pills Pregnancy And Lactation Text: This medication should be avoided if pregnant and for the first 30 days post-partum. Tazorac Counseling:  Patient advised that medication is irritating and drying. Patient may need to apply sparingly and wash off after an hour before eventually leaving it on overnight. The patient verbalized understanding of the proper use and possible adverse effects of tazorac. All of the patient's questions and concerns were addressed. Detail Level: Zone High Dose Vitamin A Counseling: Side effects reviewed, pt to contact office should one occur. Topical Sulfur Applications Pregnancy And Lactation Text: This medication is Pregnancy Category C and has an unknown safety profile during pregnancy. It is unknown if this topical medication is excreted in breast milk. Erythromycin Counseling:  I discussed with the patient the risks of erythromycin including but not limited to GI upset, allergic reaction, drug rash, diarrhea, increase in liver enzymes, and yeast infections. Isotretinoin Counseling: Patient should get monthly blood tests, not donate blood, not drive at night if vision affected, not share medication, and not undergo elective surgery for 6 months after tx completed. Side effects reviewed, pt to contact office should one occur. Sarecycline Counseling: Patient advised regarding possible photosensitivity and discoloration of the teeth, skin, lips, tongue and gums. Patient instructed to avoid sunlight, if possible. When exposed to sunlight, patients should wear protective clothing, sunglasses, and sunscreen. The patient was instructed to call the office immediately if the following severe adverse effects occur:  hearing changes, easy bruising/bleeding, severe headache, or vision changes. The patient verbalized understanding of the proper use and possible adverse effects of sarecycline. All of the patient's questions and concerns were addressed. Spironolactone Counseling: Patient advised regarding risks of diarrhea, abdominal pain, hyperkalemia, birth defects (for female patients), liver toxicity and renal toxicity. The patient may need blood work to monitor liver and kidney function and potassium levels while on therapy. The patient verbalized understanding of the proper use and possible adverse effects of spironolactone. All of the patient's questions and concerns were addressed. Minocycline Counseling: Patient advised regarding possible photosensitivity and discoloration of the teeth, skin, lips, tongue and gums. Patient instructed to avoid sunlight, if possible. When exposed to sunlight, patients should wear protective clothing, sunglasses, and sunscreen. The patient was instructed to call the office immediately if the following severe adverse effects occur:  hearing changes, easy bruising/bleeding, severe headache, or vision changes. The patient verbalized understanding of the proper use and possible adverse effects of minocycline. All of the patient's questions and concerns were addressed. Detail Level: Simple Female

## 2025-07-13 NOTE — ED PROVIDER NOTE - NSFOLLOWUPCLINICS_GEN_ALL_ED_FT
[facility]  Psychiatry  [address] [place] [address]  Phone: [phone]  Fax:   Follow Up Time: Routine    [facility]  [place] [address]  Phone: [phone]  Fax:   Follow Up Time: Routine

## 2025-07-13 NOTE — ED PROVIDER NOTE - OBJECTIVE STATEMENT
Patient is a 39-year-old female with past medical history depression on fluoxetine presenting with anxiety.  Patient states she has  had worsening anxiety over the past week due to stressors at home and at work.  Denies SI/HI/hallucinations/delusions,   Daily alcohol use, smoking, IV drug use.  Denies any past medical history, allergies, surgeries.

## 2025-07-13 NOTE — ED ADULT NURSE NOTE - OBJECTIVE STATEMENT
38 yo female sitting on a chair c/o feeling anxious that started 1 week ago. Patient denies suicidal thoughts, homicidal ideation, and visual or auditory hallucination at this time.

## 2025-07-13 NOTE — ED PROVIDER NOTE - CLINICAL SUMMARY MEDICAL DECISION MAKING FREE TEXT BOX
Patient is a 39-year-old female with past medical history depression on fluoxetine presenting with anxiety.  Patient states she has  had worsening anxiety over the past week due to stressors at home and at work.  Denies SI/HI/hallucinations/delusions,   Daily alcohol use, smoking, IV drug use.  , cooperative.  Vital signs stable.  -  Will give 1 dose 0.5 Mg Xanax, DC with hydroxyzine prescription as well as close outpatient psychiatry follow-up.  Patient states she will also  attempt to obtain an appointment with her psychiatrist at [facility].

## 2025-07-26 ENCOUNTER — EMERGENCY (EMERGENCY)
Facility: HOSPITAL | Age: 40
LOS: 1 days | End: 2025-07-26
Attending: EMERGENCY MEDICINE
Payer: MEDICAID

## 2025-07-26 VITALS
TEMPERATURE: 98 F | SYSTOLIC BLOOD PRESSURE: 123 MMHG | DIASTOLIC BLOOD PRESSURE: 79 MMHG | RESPIRATION RATE: 19 BRPM | HEIGHT: 62 IN | HEART RATE: 83 BPM | WEIGHT: 202.83 LBS | OXYGEN SATURATION: 99 %

## 2025-07-26 LAB
ALBUMIN SERPL ELPH-MCNC: 3.7 G/DL — SIGNIFICANT CHANGE UP (ref 3.5–5)
ALP SERPL-CCNC: 73 U/L — SIGNIFICANT CHANGE UP (ref 40–120)
ALT FLD-CCNC: 35 U/L DA — SIGNIFICANT CHANGE UP (ref 10–60)
ANION GAP SERPL CALC-SCNC: 7 MMOL/L — SIGNIFICANT CHANGE UP (ref 5–17)
AST SERPL-CCNC: 29 U/L — SIGNIFICANT CHANGE UP (ref 10–40)
BASOPHILS # BLD AUTO: 0.03 K/UL — SIGNIFICANT CHANGE UP (ref 0–0.2)
BASOPHILS NFR BLD AUTO: 0.5 % — SIGNIFICANT CHANGE UP (ref 0–2)
BILIRUB SERPL-MCNC: 0.2 MG/DL — SIGNIFICANT CHANGE UP (ref 0.2–1.2)
BLD GP AB SCN SERPL QL: SIGNIFICANT CHANGE UP
BUN SERPL-MCNC: 4 MG/DL — LOW (ref 7–18)
CALCIUM SERPL-MCNC: 9 MG/DL — SIGNIFICANT CHANGE UP (ref 8.4–10.5)
CHLORIDE SERPL-SCNC: 102 MMOL/L — SIGNIFICANT CHANGE UP (ref 96–108)
CO2 SERPL-SCNC: 24 MMOL/L — SIGNIFICANT CHANGE UP (ref 22–31)
CREAT SERPL-MCNC: 0.62 MG/DL — SIGNIFICANT CHANGE UP (ref 0.5–1.3)
EGFR: 115 ML/MIN/1.73M2 — SIGNIFICANT CHANGE UP
EGFR: 115 ML/MIN/1.73M2 — SIGNIFICANT CHANGE UP
EOSINOPHIL # BLD AUTO: 0.09 K/UL — SIGNIFICANT CHANGE UP (ref 0–0.5)
EOSINOPHIL NFR BLD AUTO: 1.4 % — SIGNIFICANT CHANGE UP (ref 0–6)
GLUCOSE SERPL-MCNC: 90 MG/DL — SIGNIFICANT CHANGE UP (ref 70–99)
HCG SERPL-ACNC: HIGH MIU/ML
HCT VFR BLD CALC: 38.1 % — SIGNIFICANT CHANGE UP (ref 34.5–45)
HGB BLD-MCNC: 12.8 G/DL — SIGNIFICANT CHANGE UP (ref 11.5–15.5)
IMM GRANULOCYTES NFR BLD AUTO: 0.2 % — SIGNIFICANT CHANGE UP (ref 0–0.9)
LYMPHOCYTES # BLD AUTO: 2.02 K/UL — SIGNIFICANT CHANGE UP (ref 1–3.3)
LYMPHOCYTES # BLD AUTO: 31.3 % — SIGNIFICANT CHANGE UP (ref 13–44)
MCHC RBC-ENTMCNC: 27.2 PG — SIGNIFICANT CHANGE UP (ref 27–34)
MCHC RBC-ENTMCNC: 33.6 G/DL — SIGNIFICANT CHANGE UP (ref 32–36)
MCV RBC AUTO: 81.1 FL — SIGNIFICANT CHANGE UP (ref 80–100)
MONOCYTES # BLD AUTO: 0.55 K/UL — SIGNIFICANT CHANGE UP (ref 0–0.9)
MONOCYTES NFR BLD AUTO: 8.5 % — SIGNIFICANT CHANGE UP (ref 2–14)
NEUTROPHILS # BLD AUTO: 3.76 K/UL — SIGNIFICANT CHANGE UP (ref 1.8–7.4)
NEUTROPHILS NFR BLD AUTO: 58.1 % — SIGNIFICANT CHANGE UP (ref 43–77)
NRBC BLD AUTO-RTO: 0 /100 WBCS — SIGNIFICANT CHANGE UP (ref 0–0)
PLATELET # BLD AUTO: 331 K/UL — SIGNIFICANT CHANGE UP (ref 150–400)
POTASSIUM SERPL-MCNC: 3.5 MMOL/L — SIGNIFICANT CHANGE UP (ref 3.5–5.3)
POTASSIUM SERPL-SCNC: 3.5 MMOL/L — SIGNIFICANT CHANGE UP (ref 3.5–5.3)
PROT SERPL-MCNC: 8 G/DL — SIGNIFICANT CHANGE UP (ref 6–8.3)
RBC # BLD: 4.7 M/UL — SIGNIFICANT CHANGE UP (ref 3.8–5.2)
RBC # FLD: 16.5 % — HIGH (ref 10.3–14.5)
SODIUM SERPL-SCNC: 133 MMOL/L — LOW (ref 135–145)
WBC # BLD: 6.46 K/UL — SIGNIFICANT CHANGE UP (ref 3.8–10.5)
WBC # FLD AUTO: 6.46 K/UL — SIGNIFICANT CHANGE UP (ref 3.8–10.5)

## 2025-07-26 PROCEDURE — 86901 BLOOD TYPING SEROLOGIC RH(D): CPT

## 2025-07-26 PROCEDURE — 80053 COMPREHEN METABOLIC PANEL: CPT

## 2025-07-26 PROCEDURE — 99284 EMERGENCY DEPT VISIT MOD MDM: CPT

## 2025-07-26 PROCEDURE — 76801 OB US < 14 WKS SINGLE FETUS: CPT

## 2025-07-26 PROCEDURE — 76817 TRANSVAGINAL US OBSTETRIC: CPT | Mod: 26

## 2025-07-26 PROCEDURE — 86900 BLOOD TYPING SEROLOGIC ABO: CPT

## 2025-07-26 PROCEDURE — 85025 COMPLETE CBC W/AUTO DIFF WBC: CPT

## 2025-07-26 PROCEDURE — 86850 RBC ANTIBODY SCREEN: CPT

## 2025-07-26 PROCEDURE — 76801 OB US < 14 WKS SINGLE FETUS: CPT | Mod: 26

## 2025-07-26 PROCEDURE — 76830 TRANSVAGINAL US NON-OB: CPT

## 2025-07-26 PROCEDURE — 36415 COLL VENOUS BLD VENIPUNCTURE: CPT

## 2025-07-26 PROCEDURE — 99285 EMERGENCY DEPT VISIT HI MDM: CPT | Mod: 25

## 2025-07-26 PROCEDURE — 84702 CHORIONIC GONADOTROPIN TEST: CPT

## 2025-07-26 PROCEDURE — 93005 ELECTROCARDIOGRAM TRACING: CPT

## 2025-07-26 NOTE — ED PROVIDER NOTE - NSFOLLOWUPINSTRUCTIONS_ED_ALL_ED_FT
You were evaluated for dizziness and falling out of a chair.  Your ultrasound shows a single live intrauterine gestation of 7 weeks gestation.  Your laboratories were unremarkable with the exception of your hCG is [id number].  Follow-up with your OB/GYN.

## 2025-07-26 NOTE — ED PROVIDER NOTE - CLINICAL SUMMARY MEDICAL DECISION MAKING FREE TEXT BOX
39-year-old female who presented with a syncopal episode and fell out of the chair onto her back.  She is currently 6-8 weeks gestation.  She presented with lower abdominal pain.  She appeared in no distress, hemodynamically stable.  Her abdomen was soft and nontender.  Ultrasound shows a single live intrauterine gestation  Her hCG corresponds to this ultrasound finding.  Labs were unremarkable.  Patient plans to follow-up with her OB/GYN.

## 2025-07-26 NOTE — ED PROVIDER NOTE - PATIENT PORTAL LINK FT
You can access the FollowMyHealth Patient Portal offered by [facility] by registering at the following website: [email or web address]. By joining [employer] [employer]’s FollowMyHealth portal, you will also be able to view your health information using other applications (apps) compatible with our system.

## 2025-07-26 NOTE — ED PROVIDER NOTE - OBJECTIVE STATEMENT
39-year-old female at 6-8 weeks gestation who was seated in a chair today and felt dizzy/lightheaded and fell  out of the chair and onto her back.  She denies any head strike, loss of consciousness.  She has right lower abdomen pain.  She states that she was taking an antidepressant medication prior to this episode.  She denies any pain and is not in any distress.  She is concerned about the health of the baby.  Any vaginal bleeding since the incident.

## 2025-07-26 NOTE — ED PROVIDER NOTE - PHYSICAL EXAMINATION
General: Patient well appearing, vital signs within normal limits, elevated BMI  HEENT: MMM, trachea midline  Respiratory: No respiratory distress  GI: Soft, nontender  Neuro: Moves all extremities  Skin: No rashes or lesions

## 2025-07-26 NOTE — ED ADULT NURSE NOTE - CHIEF COMPLAINT QUOTE
[place] [id number]  I'm not feeling well , I inhaled a street drug today , I felt dizzy and fell backwards on the floor , I was very confused , pt is unsure about LOC  I'm pregnant 6-8 weeks , the pregnancy test was positive   I have a pain in my lower abdomen and I feel confused

## 2025-07-26 NOTE — ED ADULT TRIAGE NOTE - CHIEF COMPLAINT QUOTE
[place] [id number]  I'm not feeling well , I inhaled a street drug today , I felt dizzy and fell backwards on the floor , I was very confused , pt is unsure about LOC  I'm pregnant 6-8 weeks , the pregnancy test was positive   I have a pain in my lower abdomen and I feel confused

## 2025-07-27 VITALS
SYSTOLIC BLOOD PRESSURE: 119 MMHG | HEART RATE: 63 BPM | TEMPERATURE: 98 F | DIASTOLIC BLOOD PRESSURE: 64 MMHG | RESPIRATION RATE: 18 BRPM | OXYGEN SATURATION: 100 %

## 2025-09-05 ENCOUNTER — EMERGENCY (EMERGENCY)
Facility: HOSPITAL | Age: 40
LOS: 1 days | End: 2025-09-05
Attending: EMERGENCY MEDICINE
Payer: MEDICAID

## 2025-09-05 VITALS
RESPIRATION RATE: 16 BRPM | HEIGHT: 62 IN | DIASTOLIC BLOOD PRESSURE: 64 MMHG | WEIGHT: 199.96 LBS | TEMPERATURE: 98 F | HEART RATE: 82 BPM | SYSTOLIC BLOOD PRESSURE: 111 MMHG | OXYGEN SATURATION: 97 %

## 2025-09-05 LAB
ALBUMIN SERPL ELPH-MCNC: 3.1 G/DL — LOW (ref 3.5–5)
ALP SERPL-CCNC: 58 U/L — SIGNIFICANT CHANGE UP (ref 40–120)
ALT FLD-CCNC: 17 U/L DA — SIGNIFICANT CHANGE UP (ref 10–60)
ANION GAP SERPL CALC-SCNC: 8 MMOL/L — SIGNIFICANT CHANGE UP (ref 5–17)
APPEARANCE UR: ABNORMAL
AST SERPL-CCNC: 16 U/L — SIGNIFICANT CHANGE UP (ref 10–40)
BACTERIA # UR AUTO: ABNORMAL /HPF
BASOPHILS # BLD AUTO: 0.04 K/UL — SIGNIFICANT CHANGE UP (ref 0–0.2)
BASOPHILS NFR BLD AUTO: 0.6 % — SIGNIFICANT CHANGE UP (ref 0–2)
BILIRUB SERPL-MCNC: 0.3 MG/DL — SIGNIFICANT CHANGE UP (ref 0.2–1.2)
BILIRUB UR-MCNC: NEGATIVE — SIGNIFICANT CHANGE UP
BLD GP AB SCN SERPL QL: SIGNIFICANT CHANGE UP
BUN SERPL-MCNC: 6 MG/DL — LOW (ref 7–18)
CALCIUM SERPL-MCNC: 8.1 MG/DL — LOW (ref 8.4–10.5)
CHLORIDE SERPL-SCNC: 102 MMOL/L — SIGNIFICANT CHANGE UP (ref 96–108)
CO2 SERPL-SCNC: 22 MMOL/L — SIGNIFICANT CHANGE UP (ref 22–31)
COLOR SPEC: SIGNIFICANT CHANGE UP
CREAT SERPL-MCNC: 0.45 MG/DL — LOW (ref 0.5–1.3)
DIFF PNL FLD: NEGATIVE — SIGNIFICANT CHANGE UP
EGFR: 125 ML/MIN/1.73M2 — SIGNIFICANT CHANGE UP
EGFR: 125 ML/MIN/1.73M2 — SIGNIFICANT CHANGE UP
EOSINOPHIL # BLD AUTO: 0.27 K/UL — SIGNIFICANT CHANGE UP (ref 0–0.5)
EOSINOPHIL NFR BLD AUTO: 3.8 % — SIGNIFICANT CHANGE UP (ref 0–6)
EPI CELLS # UR: PRESENT
GLUCOSE SERPL-MCNC: 97 MG/DL — SIGNIFICANT CHANGE UP (ref 70–99)
GLUCOSE UR QL: NEGATIVE MG/DL — SIGNIFICANT CHANGE UP
HCG SERPL-ACNC: HIGH MIU/ML
HCT VFR BLD CALC: 34.7 % — SIGNIFICANT CHANGE UP (ref 34.5–45)
HGB BLD-MCNC: 11.6 G/DL — SIGNIFICANT CHANGE UP (ref 11.5–15.5)
HYALINE CASTS # UR AUTO: PRESENT
IMM GRANULOCYTES NFR BLD AUTO: 0.4 % — SIGNIFICANT CHANGE UP (ref 0–0.9)
KETONES UR QL: ABNORMAL MG/DL
LEUKOCYTE ESTERASE UR-ACNC: ABNORMAL
LIDOCAIN IGE QN: 28 U/L — SIGNIFICANT CHANGE UP (ref 13–75)
LYMPHOCYTES # BLD AUTO: 1.51 K/UL — SIGNIFICANT CHANGE UP (ref 1–3.3)
LYMPHOCYTES # BLD AUTO: 21.4 % — SIGNIFICANT CHANGE UP (ref 13–44)
MAGNESIUM SERPL-MCNC: 1.7 MG/DL — SIGNIFICANT CHANGE UP (ref 1.6–2.6)
MCHC RBC-ENTMCNC: 27.2 PG — SIGNIFICANT CHANGE UP (ref 27–34)
MCHC RBC-ENTMCNC: 33.4 G/DL — SIGNIFICANT CHANGE UP (ref 32–36)
MCV RBC AUTO: 81.3 FL — SIGNIFICANT CHANGE UP (ref 80–100)
MONOCYTES # BLD AUTO: 0.64 K/UL — SIGNIFICANT CHANGE UP (ref 0–0.9)
MONOCYTES NFR BLD AUTO: 9.1 % — SIGNIFICANT CHANGE UP (ref 2–14)
NEUTROPHILS # BLD AUTO: 4.57 K/UL — SIGNIFICANT CHANGE UP (ref 1.8–7.4)
NEUTROPHILS NFR BLD AUTO: 64.7 % — SIGNIFICANT CHANGE UP (ref 43–77)
NITRITE UR-MCNC: NEGATIVE — SIGNIFICANT CHANGE UP
NRBC BLD AUTO-RTO: 0 /100 WBCS — SIGNIFICANT CHANGE UP (ref 0–0)
PH UR: 6 — SIGNIFICANT CHANGE UP (ref 5–8)
PHOSPHATE SERPL-MCNC: 3 MG/DL — SIGNIFICANT CHANGE UP (ref 2.5–4.5)
PLATELET # BLD AUTO: 341 K/UL — SIGNIFICANT CHANGE UP (ref 150–400)
POTASSIUM SERPL-MCNC: 3.5 MMOL/L — SIGNIFICANT CHANGE UP (ref 3.5–5.3)
POTASSIUM SERPL-SCNC: 3.5 MMOL/L — SIGNIFICANT CHANGE UP (ref 3.5–5.3)
PROT SERPL-MCNC: 7.3 G/DL — SIGNIFICANT CHANGE UP (ref 6–8.3)
PROT UR-MCNC: ABNORMAL MG/DL
RBC # BLD: 4.27 M/UL — SIGNIFICANT CHANGE UP (ref 3.8–5.2)
RBC # FLD: 14.8 % — HIGH (ref 10.3–14.5)
RBC CASTS # UR COMP ASSIST: 1 /HPF — SIGNIFICANT CHANGE UP (ref 0–4)
SODIUM SERPL-SCNC: 132 MMOL/L — LOW (ref 135–145)
SP GR SPEC: 1.02 — SIGNIFICANT CHANGE UP (ref 1–1.03)
UROBILINOGEN FLD QL: 0.2 MG/DL — SIGNIFICANT CHANGE UP (ref 0.2–1)
WBC # BLD: 7.06 K/UL — SIGNIFICANT CHANGE UP (ref 3.8–10.5)
WBC # FLD AUTO: 7.06 K/UL — SIGNIFICANT CHANGE UP (ref 3.8–10.5)
WBC UR QL: 1 /HPF — SIGNIFICANT CHANGE UP (ref 0–5)

## 2025-09-05 PROCEDURE — 86850 RBC ANTIBODY SCREEN: CPT

## 2025-09-05 PROCEDURE — 83735 ASSAY OF MAGNESIUM: CPT

## 2025-09-05 PROCEDURE — 99284 EMERGENCY DEPT VISIT MOD MDM: CPT | Mod: 25

## 2025-09-05 PROCEDURE — 87086 URINE CULTURE/COLONY COUNT: CPT

## 2025-09-05 PROCEDURE — 99284 EMERGENCY DEPT VISIT MOD MDM: CPT

## 2025-09-05 PROCEDURE — 81001 URINALYSIS AUTO W/SCOPE: CPT

## 2025-09-05 PROCEDURE — 76705 ECHO EXAM OF ABDOMEN: CPT | Mod: 26

## 2025-09-05 PROCEDURE — 83690 ASSAY OF LIPASE: CPT

## 2025-09-05 PROCEDURE — 76705 ECHO EXAM OF ABDOMEN: CPT

## 2025-09-05 PROCEDURE — 96374 THER/PROPH/DIAG INJ IV PUSH: CPT

## 2025-09-05 PROCEDURE — 86900 BLOOD TYPING SEROLOGIC ABO: CPT

## 2025-09-05 PROCEDURE — 84100 ASSAY OF PHOSPHORUS: CPT

## 2025-09-05 PROCEDURE — 96375 TX/PRO/DX INJ NEW DRUG ADDON: CPT

## 2025-09-05 PROCEDURE — 76801 OB US < 14 WKS SINGLE FETUS: CPT | Mod: 26

## 2025-09-05 PROCEDURE — 96361 HYDRATE IV INFUSION ADD-ON: CPT

## 2025-09-05 PROCEDURE — 36415 COLL VENOUS BLD VENIPUNCTURE: CPT

## 2025-09-05 PROCEDURE — 84702 CHORIONIC GONADOTROPIN TEST: CPT

## 2025-09-05 PROCEDURE — 85025 COMPLETE CBC W/AUTO DIFF WBC: CPT

## 2025-09-05 PROCEDURE — 76817 TRANSVAGINAL US OBSTETRIC: CPT | Mod: 26

## 2025-09-05 PROCEDURE — 76817 TRANSVAGINAL US OBSTETRIC: CPT

## 2025-09-05 PROCEDURE — 76801 OB US < 14 WKS SINGLE FETUS: CPT

## 2025-09-05 PROCEDURE — 80053 COMPREHEN METABOLIC PANEL: CPT

## 2025-09-05 PROCEDURE — 86901 BLOOD TYPING SEROLOGIC RH(D): CPT

## 2025-09-05 RX ORDER — ACETAMINOPHEN 500 MG/5ML
1000 LIQUID (ML) ORAL ONCE
Refills: 0 | Status: COMPLETED | OUTPATIENT
Start: 2025-09-05 | End: 2025-09-05

## 2025-09-05 RX ORDER — MAGNESIUM, ALUMINUM HYDROXIDE 200-200 MG
30 TABLET,CHEWABLE ORAL ONCE
Refills: 0 | Status: COMPLETED | OUTPATIENT
Start: 2025-09-05 | End: 2025-09-05

## 2025-09-05 RX ADMIN — Medication 20 MILLIGRAM(S): at 11:36

## 2025-09-05 RX ADMIN — Medication 1000 MILLIGRAM(S): at 12:07

## 2025-09-05 RX ADMIN — Medication 30 MILLILITER(S): at 11:36

## 2025-09-05 RX ADMIN — Medication 1000 MILLIGRAM(S): at 11:37

## 2025-09-05 RX ADMIN — Medication 400 MILLIGRAM(S): at 11:37

## 2025-09-05 RX ADMIN — Medication 1000 MILLILITER(S): at 11:36

## 2025-09-05 RX ADMIN — Medication 1000 MILLILITER(S): at 12:36

## 2025-09-06 LAB
CULTURE RESULTS: SIGNIFICANT CHANGE UP
SPECIMEN SOURCE: SIGNIFICANT CHANGE UP